# Patient Record
Sex: FEMALE | Race: WHITE | NOT HISPANIC OR LATINO | Employment: OTHER | URBAN - METROPOLITAN AREA
[De-identification: names, ages, dates, MRNs, and addresses within clinical notes are randomized per-mention and may not be internally consistent; named-entity substitution may affect disease eponyms.]

---

## 2018-12-18 LAB
ALT SERPL-CCNC: 28 IU/L (ref 12–68)
AST SERPL-CCNC: 11 IU/L (ref 12–37)
CREAT SERPL-MCNC: 1.09 MG/DL (ref 0.55–1.02)
GFR SERPL CREATININE-BSD FRML MDRD: 52 ML/MIN
GLUCOSE SERPL-MCNC: 99 MG/DL (ref 74–106)
POTASSIUM SERPL-SCNC: 4 MMOL/L (ref 3.5–5.1)
TSH SERPL-ACNC: 0.42 UIU/ML (ref 0.36–3.74)

## 2019-01-10 ENCOUNTER — TRANSFERRED RECORDS (OUTPATIENT)
Dept: HEALTH INFORMATION MANAGEMENT | Facility: CLINIC | Age: 54
End: 2019-01-10

## 2019-01-11 ENCOUNTER — MEDICAL CORRESPONDENCE (OUTPATIENT)
Dept: HEALTH INFORMATION MANAGEMENT | Facility: CLINIC | Age: 54
End: 2019-01-11

## 2019-03-05 ENCOUNTER — OFFICE VISIT (OUTPATIENT)
Dept: GASTROENTEROLOGY | Facility: CLINIC | Age: 54
End: 2019-03-05
Payer: MEDICARE

## 2019-03-05 VITALS
SYSTOLIC BLOOD PRESSURE: 123 MMHG | HEIGHT: 60 IN | WEIGHT: 214 LBS | HEART RATE: 77 BPM | OXYGEN SATURATION: 94 % | BODY MASS INDEX: 42.01 KG/M2 | DIASTOLIC BLOOD PRESSURE: 68 MMHG

## 2019-03-05 DIAGNOSIS — E66.01 MORBID OBESITY (H): ICD-10-CM

## 2019-03-05 DIAGNOSIS — R10.84 ABDOMINAL PAIN, GENERALIZED: Primary | ICD-10-CM

## 2019-03-05 DIAGNOSIS — R19.7 DIARRHEA, UNSPECIFIED TYPE: ICD-10-CM

## 2019-03-05 DIAGNOSIS — R10.13 DYSPEPSIA: ICD-10-CM

## 2019-03-05 DIAGNOSIS — K58.9 IRRITABLE BOWEL SYNDROME, UNSPECIFIED TYPE: ICD-10-CM

## 2019-03-05 LAB
ALBUMIN SERPL-MCNC: 3.7 G/DL (ref 3.4–5)
ALP SERPL-CCNC: 98 U/L (ref 40–150)
ALT SERPL W P-5'-P-CCNC: 26 U/L (ref 0–50)
AMYLASE SERPL-CCNC: 37 U/L (ref 30–110)
ANION GAP SERPL CALCULATED.3IONS-SCNC: 7 MMOL/L (ref 3–14)
AST SERPL W P-5'-P-CCNC: 11 U/L (ref 0–45)
BASOPHILS # BLD AUTO: 0.1 10E9/L (ref 0–0.2)
BASOPHILS NFR BLD AUTO: 0.8 %
BILIRUB SERPL-MCNC: 0.3 MG/DL (ref 0.2–1.3)
BUN SERPL-MCNC: 17 MG/DL (ref 7–30)
CALCIUM SERPL-MCNC: 9.3 MG/DL (ref 8.5–10.1)
CHLORIDE SERPL-SCNC: 110 MMOL/L (ref 94–109)
CO2 SERPL-SCNC: 24 MMOL/L (ref 20–32)
CREAT SERPL-MCNC: 0.94 MG/DL (ref 0.52–1.04)
CRP SERPL-MCNC: 3.5 MG/L (ref 0–8)
DIFFERENTIAL METHOD BLD: ABNORMAL
EOSINOPHIL # BLD AUTO: 0 10E9/L (ref 0–0.7)
EOSINOPHIL NFR BLD AUTO: 0.1 %
ERYTHROCYTE [DISTWIDTH] IN BLOOD BY AUTOMATED COUNT: 13.6 % (ref 10–15)
GFR SERPL CREATININE-BSD FRML MDRD: 68 ML/MIN/{1.73_M2}
GLUCOSE SERPL-MCNC: 123 MG/DL (ref 70–99)
HCT VFR BLD AUTO: 45.1 % (ref 35–47)
HGB BLD-MCNC: 14.5 G/DL (ref 11.7–15.7)
IMM GRANULOCYTES # BLD: 0.4 10E9/L (ref 0–0.4)
IMM GRANULOCYTES NFR BLD: 2.4 %
LIPASE SERPL-CCNC: 65 U/L (ref 73–393)
LYMPHOCYTES # BLD AUTO: 2.8 10E9/L (ref 0.8–5.3)
LYMPHOCYTES NFR BLD AUTO: 18.8 %
MCH RBC QN AUTO: 29.5 PG (ref 26.5–33)
MCHC RBC AUTO-ENTMCNC: 32.2 G/DL (ref 31.5–36.5)
MCV RBC AUTO: 92 FL (ref 78–100)
MONOCYTES # BLD AUTO: 0.9 10E9/L (ref 0–1.3)
MONOCYTES NFR BLD AUTO: 6.2 %
NEUTROPHILS # BLD AUTO: 10.8 10E9/L (ref 1.6–8.3)
NEUTROPHILS NFR BLD AUTO: 71.7 %
PLATELET # BLD AUTO: 315 10E9/L (ref 150–450)
POTASSIUM SERPL-SCNC: 4.4 MMOL/L (ref 3.4–5.3)
PROT SERPL-MCNC: 6.8 G/DL (ref 6.8–8.8)
RBC # BLD AUTO: 4.92 10E12/L (ref 3.8–5.2)
SODIUM SERPL-SCNC: 141 MMOL/L (ref 133–144)
WBC # BLD AUTO: 15.1 10E9/L (ref 4–11)

## 2019-03-05 PROCEDURE — 83690 ASSAY OF LIPASE: CPT | Performed by: INTERNAL MEDICINE

## 2019-03-05 PROCEDURE — 36415 COLL VENOUS BLD VENIPUNCTURE: CPT | Performed by: INTERNAL MEDICINE

## 2019-03-05 PROCEDURE — 99204 OFFICE O/P NEW MOD 45 MIN: CPT | Performed by: INTERNAL MEDICINE

## 2019-03-05 PROCEDURE — 86140 C-REACTIVE PROTEIN: CPT | Performed by: INTERNAL MEDICINE

## 2019-03-05 PROCEDURE — 80053 COMPREHEN METABOLIC PANEL: CPT | Performed by: INTERNAL MEDICINE

## 2019-03-05 PROCEDURE — 85025 COMPLETE CBC W/AUTO DIFF WBC: CPT | Performed by: INTERNAL MEDICINE

## 2019-03-05 PROCEDURE — 82150 ASSAY OF AMYLASE: CPT | Performed by: INTERNAL MEDICINE

## 2019-03-05 RX ORDER — TOPIRAMATE 100 MG/1
100 TABLET, FILM COATED ORAL 2 TIMES DAILY
Refills: 2 | COMMUNITY
Start: 2018-11-09

## 2019-03-05 RX ORDER — CHOLECALCIFEROL (VITAMIN D3) 50 MCG
1 TABLET ORAL DAILY
COMMUNITY

## 2019-03-05 RX ORDER — METOPROLOL SUCCINATE 25 MG/1
12.5 TABLET, EXTENDED RELEASE ORAL 2 TIMES DAILY
Refills: 3 | COMMUNITY
Start: 2019-02-07

## 2019-03-05 RX ORDER — LEVOTHYROXINE SODIUM 112 UG/1
112 TABLET ORAL DAILY
COMMUNITY

## 2019-03-05 RX ORDER — TRAMADOL HYDROCHLORIDE 50 MG/1
50 TABLET ORAL 2 TIMES DAILY
Refills: 2 | COMMUNITY
Start: 2019-02-23

## 2019-03-05 RX ORDER — RISPERIDONE 3 MG/1
1.5 TABLET ORAL
Status: ON HOLD | COMMUNITY
Start: 2017-06-24 | End: 2021-01-07

## 2019-03-05 RX ORDER — LORATADINE 10 MG/1
10 TABLET ORAL DAILY
COMMUNITY

## 2019-03-05 RX ORDER — NITROGLYCERIN 0.4 MG/1
0.4 TABLET SUBLINGUAL EVERY 5 MIN PRN
COMMUNITY
Start: 2019-02-15

## 2019-03-05 RX ORDER — LIOTHYRONINE SODIUM 5 UG/1
5 TABLET ORAL DAILY
COMMUNITY

## 2019-03-05 RX ORDER — LORAZEPAM 1 MG/1
1 TABLET ORAL
COMMUNITY

## 2019-03-05 RX ORDER — BUPROPION HYDROCHLORIDE 200 MG/1
1 TABLET, EXTENDED RELEASE ORAL DAILY
COMMUNITY
Start: 2019-01-08

## 2019-03-05 RX ORDER — HYDROXYCHLOROQUINE SULFATE 200 MG/1
TABLET, FILM COATED ORAL
Refills: 1 | COMMUNITY
Start: 2018-11-19

## 2019-03-05 RX ORDER — ATORVASTATIN CALCIUM 20 MG/1
20 TABLET, FILM COATED ORAL DAILY
COMMUNITY
Start: 2019-02-07

## 2019-03-05 RX ORDER — SERTRALINE HYDROCHLORIDE 100 MG/1
100 TABLET, FILM COATED ORAL DAILY
COMMUNITY

## 2019-03-05 ASSESSMENT — MIFFLIN-ST. JEOR: SCORE: 1487.2

## 2019-03-05 ASSESSMENT — PAIN SCALES - GENERAL: PAINLEVEL: EXTREME PAIN (8)

## 2019-03-05 NOTE — PROGRESS NOTES
GASTROENTEROLOGY NEW PATIENT CLINIC VISIT    CC/REFERRING MD:    Zhang Roberto    REASON FOR CONSULTATION:   Zhang Roberto for   Chief Complaint   Patient presents with     Consult     Abdominal pain and nausea after eating     HISTORY OF PRESENT ILLNESS:    Kaylah Arreola is 53 year old female who presents for evaluation of abdominal pain.  She reports that she has had abdominal pain for the last 6 months.  It occurs most days and happens within 20 minutes of eating and then last for a few hours at a time.  She has associated nausea.  She reports that she will sometimes get abdominal pain even when she is not eating.  She does not feel that passing gas or having bowel movements significantly improves the pain.  She typically has loose stools which she reports occurs up to 8 times per day.  She does not feel that her abdominal pain is worsened when she has more loose stools.  She notes that she has had evaluation with blood work, CT scan and UA for this.  She has taken acid reducing medication in the past with some relief of symptoms.  She notes that she does take Benefiber daily and feels that this helps.  She also notes that drinking tea or putting heat over her abdomen helps.  She has not lost any significant amount of weight.  She reports that she is on prednisone for bronchitis.  Prior history includes diagnostic laparoscopy in the past.  She had colonoscopy October 2018 with 3 polyps removed.  She reports that she has had EGD in the remote past.  Prior history includes AV block status post pacemaker.  She also has cardiogenic syncope.  She has multiple pain issues with fibromyalgia.  She also has depression, anxiety and bipolar disorder.    PROBLEM LIST  Patient Active Problem List    Diagnosis Date Noted     Morbid obesity (H) 03/05/2019     Priority: Medium       PERTINENT PAST MEDICAL HISTORY:  (I personally reviewed this history with the patient at today's visit)   Past Medical  History:   Diagnosis Date     Anxiety      AV block, 3rd degree (H)      Depression      Fibromyalgia      GERD (gastroesophageal reflux disease)      Syncope, cardiogenic          PREVIOUS SURGERIES: (I personally reviewed this history with the patient at today's visit)   Past Surgical History:   Procedure Laterality Date     EP PACEMAKER       HYSTERECTOMY           ALLERGIES:     Allergies   Allergen Reactions     Codeine Nausea     Other reaction(s): GI Upset, Vomiting  headaches       Hydrocodone-Acetaminophen Nausea and Nausea and Vomiting     Other reaction(s): Headache, Vomiting  headache       Metronidazole Unknown     Midodrine      Bloating.     Phenytoin Unknown     Other reaction(s): *Unknown     Ranitidine      sensitivity  Severe depression       Clarithromycin Rash     Other reaction(s): Vomiting     Metoclopramide Rash     depression    Other reaction(s): Emotional Disturbance  depression     Sulfa Drugs Hives and Rash       PERTINENT MEDICATIONS:    Current Outpatient Medications:      atorvastatin (LIPITOR) 20 MG tablet, Take 20 mg by mouth, Disp: , Rfl:      buPROPion (WELLBUTRIN SR) 200 MG 12 hr tablet, Take 1 tablet by mouth, Disp: , Rfl:      hydroxychloroquine (PLAQUENIL) 200 MG tablet, TAKE 1 TABLET BY MOUTH TWICE A DAY, Disp: , Rfl: 1     levothyroxine (SYNTHROID/LEVOTHROID) 112 MCG tablet, Take 224 mcg by mouth, Disp: , Rfl:      liothyronine (CYTOMEL) 5 MCG tablet, Take 5 mcg by mouth daily, Disp: , Rfl:      loratadine (CLARITIN) 10 MG tablet, Take 10 mg by mouth daily, Disp: , Rfl:      LORazepam (ATIVAN) 1 MG tablet, Take 1 mg by mouth Take 3 tabs at 8pm, Disp: , Rfl:      metoprolol succinate ER (TOPROL-XL) 25 MG 24 hr tablet, TAKE 0.5 TABLETS BY MOUTH TWICE A DAY, Disp: , Rfl: 3     nitroGLYcerin (NITROSTAT) 0.4 MG sublingual tablet, Place 0.4 mg under the tongue, Disp: , Rfl:      risperiDONE (RISPERDAL) 3 MG tablet, Take 1.5 mg by mouth, Disp: , Rfl:      sertraline (ZOLOFT) 100 MG  tablet, Take 200 mg by mouth, Disp: , Rfl:      sertraline (ZOLOFT) 50 MG tablet, TAKE ONE TAB BY MOUTH ONCE DAILY. TAKE ALONG WITH 200MG FOR A TOTAL DAILY DOSE OF 250MG, Disp: , Rfl: 3     tiZANidine (ZANAFLEX) 4 MG tablet, Take 4 mg by mouth, Disp: , Rfl:      topiramate (TOPAMAX) 50 MG tablet, TAKE 1 TAB BY MOUTH EVERY MORNING AND 2 TAB BY MOUTH NIGHTLY, Disp: , Rfl: 2     traMADol (ULTRAM) 50 MG tablet, TAKE 2 TABLETS BY MOUTH TWICE A DAY, Disp: , Rfl: 2     vitamin D3 (CHOLECALCIFEROL) 2000 units tablet, Take 1 tablet by mouth, Disp: , Rfl:     SOCIAL HISTORY:  Social History     Socioeconomic History     Marital status:      Spouse name: Not on file     Number of children: Not on file     Years of education: Not on file     Highest education level: Not on file   Occupational History     Not on file   Social Needs     Financial resource strain: Not on file     Food insecurity:     Worry: Not on file     Inability: Not on file     Transportation needs:     Medical: Not on file     Non-medical: Not on file   Tobacco Use     Smoking status: Former Smoker     Smokeless tobacco: Never Used   Substance and Sexual Activity     Alcohol use: Not on file     Drug use: Not on file     Sexual activity: Not on file   Lifestyle     Physical activity:     Days per week: Not on file     Minutes per session: Not on file     Stress: Not on file   Relationships     Social connections:     Talks on phone: Not on file     Gets together: Not on file     Attends Yazidism service: Not on file     Active member of club or organization: Not on file     Attends meetings of clubs or organizations: Not on file     Relationship status: Not on file     Intimate partner violence:     Fear of current or ex partner: Not on file     Emotionally abused: Not on file     Physically abused: Not on file     Forced sexual activity: Not on file   Other Topics Concern     Parent/sibling w/ CABG, MI or angioplasty before 65F 55M? Not Asked    Social History Narrative     Not on file       FAMILY HISTORY: (I personally reviewed this history with the patient at today's visit)  No family history of esophageal cancer, stomach cancer, colon cancer, pancreas cancer      ROS:    No fevers or chills  No weight loss  No blurry vision, double vision or change in vision  No sore throat  No lymphadenopathy  No headache, paraesthesias, or weakness in a limb  No shortness of breath or wheezing  No chest pain or pressure  No arthralgias or myalgias  No rashes or skin changes  No odynophagia or dysphagia  No BRBPR, hematochezia, melena  No dysuria, frequency or urgency  No hot/cold intolerance or polyria  No anxiety or depression  PHYSICAL EXAMINATION:  Constitutional: aaox3, cooperative, pleasant, not dyspneic/diaphoretic, no acute distress  Vitals reviewed: /68   Pulse 77   Ht 1.524 m (5')   Wt 97.1 kg (214 lb)   SpO2 94%   BMI 41.79 kg/m    Wt:   Wt Readings from Last 2 Encounters:   03/05/19 97.1 kg (214 lb)      Eyes: Sclera anicteric/injected  Ears/nose/mouth/throat: Normal oropharynx without ulcers or exudate, mucus membranes moist, hearing intact  Neck: supple, thyroid normal size  CV: No edema, RRR  Respiratory: Unlabored breathing, CTAB  Lymph: No submandibular, supraclavicular or inguinal lymphadenopathy  Abd: obese, Nondistended, no masses, +bs, no hepatosplenomegaly, mildly tender LLQ, no peritoneal signs  Skin: warm, perfused, no jaundice  Psych: Normal affect  MSK: Normal gait      PERTINENT STUDIES: (I personally reviewed these laboratory studies today)  Most recent CBC:   Recent Labs   Lab Test 03/05/19  1514   WBC 15.1*   HGB 14.5   HCT 45.1        Most recent hepatic panel:  Recent Labs   Lab Test 03/05/19  1514 12/18/18   ALT 26 28   AST 11 11*     Most recent creatinine:  Recent Labs   Lab Test 03/05/19  1514 12/18/18   CR 0.94 1.09*       RADIOLOGY:      IMPRESSION:     Essentially negative exam. No finding as cause for  abdominal pain.    REPORT SIGNED BY DR. Mark Rivas   Result Narrative   EXAM: CT ABDOMEN AND PELVIS WITH CONTRAST, 1/17/2019    CLINICAL DATA: Unspecified abdominal pain. Abdominal pain, acute.    COMPARISON: 12/22/2008.    TECHNIQUE: Axial, sagittal, and coronal images of the abdomen and pelvis reconstructed after IV and oral contrast administration.    CONTRAST: 100  ml Omnipaque 350 IV    FINDINGS: Lung bases are clear. Pacemaker leads visible.    Liver, gallbladder, spleen, pancreas, and adrenal glands are normal.    Normal left kidney. 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 Appearance unchanged from before.    Stomach and small bowel are normal. Appendix not visible, but there is no pericecal inflammation. Normal colon.    Urinary bladder has normal contours. Hysterectomy. No adnexal abnormality.    No free fluid, lymphadenopathy, or inflammatory fat stranding. Normal abdominal aorta.    Fairly advanced disc degeneration at L1-2 with interspace collapse and diffuse disc bulge with osteophytes. No acute bone abnormality.   Other Result Information   Interface, [facility] - 01/17/2019  5:09 PM CST  EXAM: CT ABDOMEN AND PELVIS WITH CONTRAST, 1/17/2019    CLINICAL DATA: Unspecified abdominal pain. Abdominal pain, acute.    COMPARISON: 12/22/2008.    TECHNIQUE: Axial, sagittal, and coronal images of the abdomen and pelvis reconstructed after IV and oral contrast administration.    CONTRAST: 100  ml Omnipaque 350 IV    FINDINGS: Lung bases are clear. Pacemaker leads visible.    Liver, gallbladder, spleen, pancreas, and adrenal glands are normal.    Normal left kidney. 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 Appearance unchanged from before.    Stomach and small bowel are normal. Appendix not visible, but there is no pericecal inflammation. Normal colon.    Urinary bladder has normal contours. Hysterectomy. No adnexal  abnormality.    No free fluid, lymphadenopathy, or inflammatory fat stranding. Normal abdominal aorta.    Fairly advanced disc degeneration at L1-2 with interspace collapse and diffuse disc bulge with osteophytes. No acute bone abnormality.    IMPRESSION  IMPRESSION:     Essentially negative exam. No finding as cause for abdominal pain.    REPORT SIGNED BY DR. Mark Rivas       (I personally reviewed the radiology images listed above)    ASSESSMENT/PLAN:    Kaylah Arreola is a 53 year old female who presents for evaluation of postprandial abdominal pain.  Suspect that she may have some combination of dyspepsia and perhaps irritable bowel.  I suggested that she have further evaluation with lab tests in order to ensure that this is not celiac disease, rule out pancreatic or liver pathology, evaluate for inflammation.  We will also get stool test to rule out any infectious issue or inflammation in the bowel.  I recommended an ultrasound to assess the gallbladder for the presence of gallstones in the gallbladder or the bile duct.  She should also be seen by nutrition for dietary counseling as I suspect this may play a large role underlying her issues.  We will then plan to see her back in the office.  We discussed that a trial of Bentyl could be considered but will hold off at this time given that she is also already on multiple medications and prefers to not add to her medication regimen at this point.      Abdominal pain, generalized  Dyspepsia  Irritable bowel syndrome, unspecified type  Diarrhea, unspecified type  Morbid obesity (H)  Orders Placed This Encounter   Procedures     CRP inflammation     Standing Status:   Future     Number of Occurrences:   1     Standing Expiration Date:   3/4/2020     Calprotectin Feces     Standing Status:   Future     Standing Expiration Date:   3/4/2020     Comprehensive metabolic panel     Standing Status:   Future     Number of Occurrences:   1     Standing Expiration Date:    3/4/2020     CBC with platelets differential     Last Lab Result: No results found for: HGB     Standing Status:   Future     Number of Occurrences:   1     Standing Expiration Date:   3/4/2020     Lipase     Standing Status:   Future     Number of Occurrences:   1     Standing Expiration Date:   3/4/2020     Amylase     Standing Status:   Future     Number of Occurrences:   1     Standing Expiration Date:   3/4/2020     H Pylori antigen stool     Standing Status:   Future     Standing Expiration Date:   3/4/2020     Clostridium difficile toxin B PCR     Standing Status:   Future     Standing Expiration Date:   4/4/2019     Ova and Parasite Exam Routine     Standing Status:   Future     Standing Expiration Date:   3/4/2020         RTC 3 months    Thank you for this consultation.  It was a pleasure to participate in the care of this patient; please contact us with any further questions.   This note was created with voice recognition software, and while reviewed for accuracy, typos may remain.     Zhen Owens MD  Adjunct  of Medicine  Division of Gastroenterology, Hepatology and Nutrition  [facility]  [phone]

## 2019-03-05 NOTE — LETTER
March 13, 2019      Kaylah OH Maxwell  [address]  [address]  [place] [address]        Dear ,    We are writing to inform you of your test results.  The labs are all within the expected range.  The white blood cell count is slightly high which may be from recent prednisone use.  Please let us know if you have any questions.      Resulted Orders   Amylase   Result Value Ref Range    Amylase 37 30 - 110 U/L   Lipase   Result Value Ref Range    Lipase 65 (L) 73 - 393 U/L   CBC with platelets differential   Result Value Ref Range    WBC 15.1 (H) 4.0 - 11.0 10e9/L    RBC Count 4.92 3.8 - 5.2 10e12/L    Hemoglobin 14.5 11.7 - 15.7 g/dL    Hematocrit 45.1 35.0 - 47.0 %    MCV 92 78 - 100 fl    MCH 29.5 26.5 - 33.0 pg    MCHC 32.2 31.5 - 36.5 g/dL    RDW 13.6 10.0 - 15.0 %    Platelet Count 315 150 - 450 10e9/L    Diff Method Automated Method     % Neutrophils 71.7 %    % Lymphocytes 18.8 %    % Monocytes 6.2 %    % Eosinophils 0.1 %    % Basophils 0.8 %    % Immature Granulocytes 2.4 %    Absolute Neutrophil 10.8 (H) 1.6 - 8.3 10e9/L    Absolute Lymphocytes 2.8 0.8 - 5.3 10e9/L    Absolute Monocytes 0.9 0.0 - 1.3 10e9/L    Absolute Eosinophils 0.0 0.0 - 0.7 10e9/L    Absolute Basophils 0.1 0.0 - 0.2 10e9/L    Abs Immature Granulocytes 0.4 0 - 0.4 10e9/L   Comprehensive metabolic panel   Result Value Ref Range    Sodium 141 133 - 144 mmol/L    Potassium 4.4 3.4 - 5.3 mmol/L    Chloride 110 (H) 94 - 109 mmol/L    Carbon Dioxide 24 20 - 32 mmol/L    Anion Gap 7 3 - 14 mmol/L    Glucose 123 (H) 70 - 99 mg/dL      Comment:      Non Fasting    Urea Nitrogen 17 7 - 30 mg/dL    Creatinine 0.94 0.52 - 1.04 mg/dL    GFR Estimate 68 >60 mL/min/[1.73_m2]      Comment:      Non  GFR Calc  Starting 12/18/2018, serum creatinine based estimated GFR (eGFR) will be   calculated using the Chronic Kidney Disease Epidemiology Collaboration   (CKD-EPI) equation.      GFR Estimate If Black 79 >60 mL/min/[1.73_m2]       Comment:       GFR Calc  Starting 12/18/2018, serum creatinine based estimated GFR (eGFR) will be   calculated using the Chronic Kidney Disease Epidemiology Collaboration   (CKD-EPI) equation.      Calcium 9.3 8.5 - 10.1 mg/dL    Bilirubin Total 0.3 0.2 - 1.3 mg/dL    Albumin 3.7 3.4 - 5.0 g/dL    Protein Total 6.8 6.8 - 8.8 g/dL    Alkaline Phosphatase 98 40 - 150 U/L    ALT 26 0 - 50 U/L    AST 11 0 - 45 U/L   CRP inflammation   Result Value Ref Range    CRP Inflammation 3.5 0.0 - 8.0 mg/L       If you have any questions or concerns, please call the clinic at the number listed above.       Sincerely,        Zhen Owens MD

## 2019-03-05 NOTE — NURSING NOTE
Kaylah Arreola's goals for this visit include:   Chief Complaint   Patient presents with     Consult     Abdominal pain and nausea after eating       She requests these members of her care team be copied on today's visit information: yes    PCP: Zhang Roberto    Referring Provider:  Zhang Roberto  [facility]  [address] [place] [address]    /68   Pulse 77   Ht 1.524 m (5')   Wt 97.1 kg (214 lb)   SpO2 94%   BMI 41.79 kg/m      Do you need any medication refills at today's visit? No    Gaudencio Anguiano, CMA

## 2019-03-08 DIAGNOSIS — R10.84 ABDOMINAL PAIN, GENERALIZED: ICD-10-CM

## 2019-03-08 DIAGNOSIS — R19.7 DIARRHEA, UNSPECIFIED TYPE: ICD-10-CM

## 2019-03-08 DIAGNOSIS — R10.13 DYSPEPSIA: ICD-10-CM

## 2019-03-08 LAB
C DIFF TOX B STL QL: NEGATIVE
SPECIMEN SOURCE: NORMAL

## 2019-03-08 PROCEDURE — 83993 ASSAY FOR CALPROTECTIN FECAL: CPT | Performed by: INTERNAL MEDICINE

## 2019-03-08 PROCEDURE — 87177 OVA AND PARASITES SMEARS: CPT | Performed by: INTERNAL MEDICINE

## 2019-03-08 PROCEDURE — 87338 HPYLORI STOOL AG IA: CPT | Performed by: INTERNAL MEDICINE

## 2019-03-08 PROCEDURE — 87493 C DIFF AMPLIFIED PROBE: CPT | Performed by: INTERNAL MEDICINE

## 2019-03-08 PROCEDURE — 87209 SMEAR COMPLEX STAIN: CPT | Performed by: INTERNAL MEDICINE

## 2019-03-11 LAB
H PYLORI AG STL QL IA: NORMAL
O+P STL MICRO: NORMAL
O+P STL MICRO: NORMAL
SPECIMEN SOURCE: NORMAL
SPECIMEN SOURCE: NORMAL

## 2019-03-12 ENCOUNTER — ANCILLARY PROCEDURE (OUTPATIENT)
Dept: ULTRASOUND IMAGING | Facility: CLINIC | Age: 54
End: 2019-03-12
Attending: INTERNAL MEDICINE
Payer: MEDICARE

## 2019-03-12 DIAGNOSIS — R10.84 ABDOMINAL PAIN, GENERALIZED: ICD-10-CM

## 2019-03-12 LAB — CALPROTECTIN STL-MCNT: <27 MG/KG (ref 0–49.9)

## 2019-03-12 PROCEDURE — 76700 US EXAM ABDOM COMPLETE: CPT | Performed by: RADIOLOGY

## 2019-03-14 DIAGNOSIS — R10.13 ABDOMINAL PAIN, EPIGASTRIC: Primary | ICD-10-CM

## 2019-03-14 DIAGNOSIS — R10.13 DYSPEPSIA: ICD-10-CM

## 2019-03-14 NOTE — TELEPHONE ENCOUNTER
[facility] Call Center    Phone Message    May a detailed message be left on voicemail: no    Reason for Call: Requesting Results   Name/type of test: stool and blood test resutls  Date of test: 3.8.19  Was test done at a location other than [facility] (Please fill in the location if not [facility])?: No      Action Taken: Message routed to:  Adult Clinics: Gastroenterology (GI) p [id number]

## 2019-03-14 NOTE — TELEPHONE ENCOUNTER
YOBANI spoke with patient and reviewed lab results and letter with her. Patient would like to know what the next steps should be moving forward.     Encounter routed to MD for further recommendations.     YOBANI Matthews

## 2019-03-18 NOTE — TELEPHONE ENCOUNTER
Zhen Owens MD de La Rosa, Mersadiez, CMA   Caller: Unspecified (4 days ago,  1:02 PM)             Please let her know the followin.  She should schedule appointment with Samantha Us of nutrition     2.  Start omeprazole 20mg daily.  Take 30 minutes prior to breakfast.  It can be obtained OTC or I can send Rx.     3.  Then follow up in the office as scheduled.        YOBANI spoke with patient. Assisted in making appt with Samantha Us. She also has an appt with ADE Rodríguez in May and will keep this appt.  Patient would also like the Omeprazole 20mg sent as a prescription.     Message forwarded to [facility] for prescription fill.    YOBANI Matthews

## 2019-03-18 NOTE — TELEPHONE ENCOUNTER
Patient following up on this medication request from this morning.  She states that she has been waiting for Dr. Owens to send in the Omeprazole and it hasn't been sent yet.  Please call patient when it has been sent.

## 2019-03-29 ENCOUNTER — TELEPHONE (OUTPATIENT)
Dept: GASTROENTEROLOGY | Facility: CLINIC | Age: 54
End: 2019-03-29

## 2019-03-29 NOTE — TELEPHONE ENCOUNTER
I reached out to [employer] which is patient's secondary insurance. [employer] was requesting CPT codes for patient's nutrition referral. Codes given were 66933 and 35736. No PA is required and both codes are covered under this insurance.     Also reached out to patient to discuss. Advised patient that both CPT codes were given to [employer]. Patient understands that Medicare does not cover the visits but that [employer] will  whatever is not covered.     Per Gabriella with [employer]. Patient's [employer] plan follows Medicaid guidelines and will cover what Medicare does not. Ref # [id number].

## 2019-03-29 NOTE — TELEPHONE ENCOUNTER
[facility] Call Center    Phone Message    May a detailed message be left on voicemail: no    Reason for Call: Other: Pt has question regarding insurance coverage for procedure with Dr Owens.  Pt states insurance company is asking for a call back from dept.  [employer].  Phone: [phone]    Or call provider number [phone]    Nutritional evaluation.  [employer] also needs a procedure and diagnosis code.        Action Taken: Message routed to:  Adult Clinics: Gastroenterology (GI) p [id number]

## 2019-03-29 NOTE — TELEPHONE ENCOUNTER
[facility] Call Center    Phone Message  Patient calling to give information regarding insurance - coding -wording for last visit, Need to call [employer] for a code.   Please call patient, she would like to explain it in further detail.     May a detailed message be left on voicemail: yes    Reason for Call: Other: Patient calling to give information regarding insurance - coding -wording for last visit, Need to call [employer] for a code.      Action Taken: Message routed to:  Adult Clinics: Gastroenterology (GI) p [id number]

## 2019-03-29 NOTE — TELEPHONE ENCOUNTER
Nurse returned call to patient and gave her the number to call the financial counselors.     Krissy Wu RN, BSN, PHN  [facility]  GI/Gen Surg/Hepatology Care Coordinator

## 2019-04-23 ENCOUNTER — OFFICE VISIT (OUTPATIENT)
Dept: NUTRITION | Facility: CLINIC | Age: 54
End: 2019-04-23
Payer: MEDICARE

## 2019-04-23 DIAGNOSIS — R19.7 DIARRHEA, UNSPECIFIED TYPE: ICD-10-CM

## 2019-04-23 DIAGNOSIS — K58.9 IBS (IRRITABLE BOWEL SYNDROME): Primary | ICD-10-CM

## 2019-04-23 DIAGNOSIS — E66.01 MORBID OBESITY (H): ICD-10-CM

## 2019-04-23 PROCEDURE — 97802 MEDICAL NUTRITION INDIV IN: CPT | Performed by: DIETITIAN, REGISTERED

## 2019-04-23 NOTE — PROGRESS NOTES
Medical Nutrition Therapy  Visit Type:Initial assessment and intervention    Referring Provider: No ref. provider found  Internal ([facility]) gastroenterology    REASON FOR REFERRAL:   Kaylha Arreola presents today for MNT and education related to weight management and IBS   She is accompanied by spouse Maco.     NUTRITION ASSESSMENT:   Patient comments/concerns relating to nutrition: Kaylah Arreola is 53 year old female who is struggling with digestive issues (abdominal pain, nausea, diarrhea). Her abdominal pain has incrased over the past 6 months and can is consistent both when eating and not eating. She has been having more diarrhea lately. It used to be 8-10x per day and over the past couple weeks have gotten better as she is not eating as much.  She hasn't been able to pinpoint what foods cause her issues. She has been taking benefiber daily and notices that helps. Also, when she has kambucha she notices that the probiotics do help decrease the diarrhea. She has tried the probiotic over the counter in a pill form and just doesn't take consistently to notice a differeance. She is on medical assistance and has free home delivery and notices there is nothing good in the meals they bring. She often can't eat because she will get horrible digestive issues. She hasn't tried the low carb or gluten free options yet. She wants to find out what foods are best for her to eat, as it seems everything goes right through her.       Food/Nutrition History:    Dietary Changes:She and her  have been working on trying to take out pop.     Food allergies/sensitivities/intolerances: Lactose intolerance                                                                               Eating Patterns & Meal Planning:    Waking up at: 5-6am   Going to bed at:    Consuming Breakfast: Yes 2 times/week  8am has coffee black - used to have breakfast more daily because it gives stomach cramps - she would have muffin. She seems to tolerate  peanut butter with potato bread which seems to be ok. They stopped doing whole grain wheat because of the diverticulitis       Consuming Lunch:  Yes 3 times/week  11:30am leftover chicken, bagon casserole       Consuming Dinner:  Yes 7 times/week  6pm Chicken, potatoes, coleslaw       Snacks: Yes 4 times/week  3pm chips       Skips meals/snacks: Yes  Mostly breakfast and lunch     Beverages: Yes coffee 3-4 cups, milk, soda, water 4-5 8oz cups per day    Barriers around meals/snacks include:Motivation/Ready to change , Financial concerns, Lack of cooking skills , Lack of control over emotions/behaviors, Lack of nutrition knowlege and digestive issues       Diet is high in: carbs from refined grains/sugar, pop, coffee, and dairy (higher fodmap foods/nightshades - potatoes)   Diet is low in: fat (unsaturated), fiber, fruits, protein and vegetables    Grocery Shopping: No every 2 weeks     Barriers Include:Motivation/Ready to change , Lack of cooking skills , Lack of control over emotions/behaviors and Lack of nutrition knowlege    Reading Food Labels:  No  Barriers Include:Motivation/Ready to change , Lack of cooking skills , Lack of control over emotions/behaviors and Lack of nutrition knowlege    Cooking at home: No  does cooking     Eats out: Yes   1-3 meals/per week mostly bar/grill type places  Barriers around eating/cooking Include:Motivation/Ready to change , Financial concerns, Lack of cooking skills , Lack of control over emotions/behaviors and Lack of nutrition knowlege      Lifestyle Patterns:  Feels stressed with life right now: No      no regular exercise program    Struggling with sleep: Yes    Average hours of daily sleep: 7-9 hours daily   Barriers Include:wakes up in middle of night, snacks during night, takes meds for sleep, feels tired upon waking or during day     Social History:   Lives with: spouse      Pertinent Past Medical History:   I have reviewed this patient's medical history  and updated it with pertinent information if needed.   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  Past Surgical History:   Procedure Laterality Date     EP PACEMAKER       HYSTERECTOMY         Physical Findings:   Digestive System:stomach or abdominal pains, cramping , heartburn/acid reflux or indigestion  and nausea       Normal Bowl Movements: No  # of Bowl movements: >3x per day   Irregular Bowl Movements: varies a lot , loose , liquid  and formed and soft     Medications:  Current Outpatient Medications   Medication     atorvastatin (LIPITOR) 20 MG tablet     buPROPion (WELLBUTRIN SR) 200 MG 12 hr tablet     hydroxychloroquine (PLAQUENIL) 200 MG tablet     levothyroxine (SYNTHROID/LEVOTHROID) 112 MCG tablet     liothyronine (CYTOMEL) 5 MCG tablet     loratadine (CLARITIN) 10 MG tablet     LORazepam (ATIVAN) 1 MG tablet     metoprolol succinate ER (TOPROL-XL) 25 MG 24 hr tablet     nitroGLYcerin (NITROSTAT) 0.4 MG sublingual tablet     omeprazole (PRILOSEC) 20 MG DR capsule     risperiDONE (RISPERDAL) 3 MG tablet     sertraline (ZOLOFT) 100 MG tablet     sertraline (ZOLOFT) 50 MG tablet     tiZANidine (ZANAFLEX) 4 MG tablet     topiramate (TOPAMAX) 50 MG tablet     traMADol (ULTRAM) 50 MG tablet     vitamin D3 (CHOLECALCIFEROL) 2000 units tablet     No current facility-administered medications for this visit.        LABS:  Last Basic Metabolic Panel:  Lab Results   Component Value Date     03/05/2019      Lab Results   Component Value Date    POTASSIUM 4.4 03/05/2019     Lab Results   Component Value Date    CHLORIDE 110 03/05/2019     Lab Results   Component Value Date    MARIOLA 9.3 03/05/2019     Lab Results   Component Value Date    CO2  24 03/05/2019     Lab Results   Component Value Date    BUN 17 03/05/2019     Lab Results   Component Value Date    CR 0.94 03/05/2019     Lab Results   Component Value Date     03/05/2019       Last Glucose Profile:   No results found for: A1C    Last Lipid Profile:   No results found for: CHOL  No results found for: HDL  No results found for: LDL  No results found for: TRIG  No results found for: CHOLHDLRATIO    Most recent CBC:  Recent Labs   Lab Test 03/05/19  1514   WBC 15.1*   HGB 14.5   HCT 45.1        Most recent hepatic panel:  Recent Labs   Lab Test 03/05/19  1514 12/18/18   ALT 26 28   AST 11 11*     Most recent creatinine:  Recent Labs   Lab Test 03/05/19  1514 12/18/18   CR 0.94 1.09*       Last Thyroid Profile:   TSH   Date Value Ref Range Status   12/18/2018 0.416 0.358 - 3.740 UIU/mL Final       Last Mineral Profile:   No results found for: MATTHIEU, IRON, FEB      Last Vitamin Profile:   No results found for: BNS533, NXQK704, [id number], VITD3, D2VIT, D3VIT, DTOT, [id number], [id number], [id number], [id number], [id number], [id number]    ANTHROPOMETRICS:  Vitals:   BP Readings from Last 1 Encounters:   03/05/19 123/68     Pulse Readings from Last 1 Encounters:   03/05/19 77     Estimated body mass index is 41.79 kg/m  as calculated from the following:    Height as of 3/5/19: 1.524 m (5').    Weight as of 3/5/19: 97.1 kg (214 lb).    Wt Readings from Last 5 Encounters:   03/05/19 97.1 kg (214 lb)         NUTRITION DIAGNOSIS:   1. Altered GI function related to high intake of fodmap foods from dairy/pop, and processed grains/wheat as evidenced by food recall, BMI of 41.     2. Overweight/Obesity related to high intake of processed sugars/pop- low protein/healhty fats as evidenced by BMI of 41.     NUTRITION INTERVENTION:  1. Eliminate inflammatory and high fodmap foods. Start elimination of most inflammatory foods to help identify if they are triggering digestive issues, weight imbalances.  "Cut out gluten, dairy for 30 to see how your gut feels and what happens to your other symptoms.  Keep a food record of your usual intake and symptoms for 30 days use [employer] regis. Also will take into consideration nightshades as well as high fodmaps as we go.   2. Work on snack and meal planning. Grabbing more hemp powder/kaitlynn powder, nut butters/avocado with lowfod fruit/guac with veggies/apple/banana with almond butter, hummus. Start with incorporating smoothie for breakfast/snacks that includes a protein, heathy fat, fruit, veggies and low sugar liquid such as unsweetened almond milk. Also, protein bone broth with tea. Even add 1 tsp of coconut oil for heathy fat.   4. Increase intake of healthy fats- Omega 3's from wild caught fish, kaitlynn/flax powder, avocados, avocado oil, primal kitchen avocado nelson/dressing, 100% grass fed butter/ghee.   Note: leaning importance of incorporating healthy fats and understanding how low carb, low fat, calorie counting and how research has changed over the years. Read book \"Eat Fat Get Thin\" and get the cookbook to go with to help increase knowledge around importance of healthy fats.   5. Review the labels and watching out for high carb/high sugar foods that may be low in calories but not high in nutrients like handful of mixed nuts.- focus on 6-15g or carbs is more low and 0-<5g of sugar is low and 5g of fiber or more is high.   6. Start [employer] womens essentials prime multivitamin with omega 3 fats and bone building support. 1 packet daily: take half packet  twice daily with meal and or snack.   7. Rebuild your friendly bacteria in your microbime. Start taking probiotic supplements. They will help rebuild the healthy bacteria so essential to good gut health. Recommend trying BIOHM health to help break down digestive plaque and get in amylase and variety of good bacteria.  Take daily anytime with our without food.      -Recipes & Food Lists:  Breakfast smoothie   Eat fat get " thin 21 day meal plan with recipes     PATIENT'S BEHAVIOR CHANGE GOALS:   See nutrition prescription for patient stated behavior change goals. AVS was printed and given to patient at today's appointment.    MONITOR / EVALUATE:  Registered Dietitian will monitor/evaluate the following:     Beliefs and attitudes related to food    Food and nutrition knowledge / skills    Food / Beverage / Nutrient intake     Pertinent Labs    Progress toward meeting stated nutrition-related goals    Readiness to change nutrition-related behaviors    Weight change    Digestion     COORDINATION OF CARE:  Follow up with gastroenterology      FOLLOW-UP:  Follow-up appointment scheduled in 2 weeks.         Time spent in minutes: 90 mins or 6 units   Encounter: Individual    Samantha Us RD, CLT, LD  Integrative Registered Dietitian

## 2019-04-23 NOTE — PATIENT INSTRUCTIONS
"NUTRITION INTERVENTION:  1. Eliminate inflammatory and high fodmap foods. Start elimination of most inflammatory foods to help identify if they are triggering digestive issues, weight imbalances. Cut out gluten, dairy for 30 to see how your gut feels and what happens to your other symptoms.  Keep a food record of your usual intake and symptoms for 30 days use [employer] regis. Also will take into consideration nightshades as well as high fodmaps as we go.   2. Work on snack and meal planning. Grabbing more hemp powder/kaitlynn powder, nut butters/avocado with lowfod fruit/guac with veggies/apple/banana with almond butter, hummus. Start with incorporating smoothie for breakfast/snacks that includes a protein, heathy fat, fruit, veggies and low sugar liquid such as unsweetened almond milk. Also, protein bone broth with tea. Even add 1 tsp of coconut oil for heathy fat.   4. Increase intake of healthy fats- Omega 3's from wild caught fish, kaitlynn/flax powder, avocados, avocado oil, primal kitchen avocado nelson/dressing, 100% grass fed butter/ghee.   Note: leaning importance of incorporating healthy fats and understanding how low carb, low fat, calorie counting and how research has changed over the years. Read book \"Eat Fat Get Thin\" and get the cookbook to go with to help increase knowledge around importance of healthy fats.   5. Review the labels and watching out for high carb/high sugar foods that may be low in calories but not high in nutrients like handful of mixed nuts.- focus on 6-15g or carbs is more low and 0-<5g of sugar is low and 5g of fiber or more is high.   6. Start [employer] womens essentials prime multivitamin with omega 3 fats and bone building support. 1 packet daily: take half packet  twice daily with meal and or snack.   7. Rebuild your friendly bacteria in your microbime. Start taking probiotic supplements. They will help rebuild the healthy bacteria so essential to good gut health. Recommend trying BIOHM " health to help break down digestive plaque and get in amylase and variety of good bacteria.  Take daily anytime with our without food.      -Recipes & Food Lists:  Breakfast smoothie   Eat fat get thin 21 day meal plan with recipes

## 2019-05-22 ENCOUNTER — OFFICE VISIT (OUTPATIENT)
Dept: GASTROENTEROLOGY | Facility: CLINIC | Age: 54
End: 2019-05-22
Payer: MEDICARE

## 2019-05-22 VITALS
SYSTOLIC BLOOD PRESSURE: 126 MMHG | DIASTOLIC BLOOD PRESSURE: 77 MMHG | HEART RATE: 94 BPM | BODY MASS INDEX: 42.07 KG/M2 | HEIGHT: 60 IN | WEIGHT: 214.3 LBS | OXYGEN SATURATION: 96 %

## 2019-05-22 DIAGNOSIS — R10.84 GENERALIZED POSTPRANDIAL ABDOMINAL PAIN: Primary | ICD-10-CM

## 2019-05-22 DIAGNOSIS — R19.7 DIARRHEA, UNSPECIFIED TYPE: ICD-10-CM

## 2019-05-22 PROCEDURE — 99213 OFFICE O/P EST LOW 20 MIN: CPT | Performed by: PHYSICIAN ASSISTANT

## 2019-05-22 ASSESSMENT — MIFFLIN-ST. JEOR: SCORE: 1488.56

## 2019-05-22 ASSESSMENT — PAIN SCALES - GENERAL: PAINLEVEL: SEVERE PAIN (6)

## 2019-05-22 NOTE — NURSING NOTE
Kaylah Arreola's goals for this visit include:   Chief Complaint   Patient presents with     RECHECK     F/u for abdominal pain; Patient reports symptoms have improved.       She requests these members of her care team be copied on today's visit information: yes    PCP: Zhang Roberto MD    Referring Provider:  Zhang Roberto  [id number] [facility] [address] [place] [address]    /77 (BP Location: Left arm, Patient Position: Sitting, Cuff Size: Adult Regular)   Pulse 94   Ht 1.524 m (5')   Wt 97.2 kg (214 lb 4.8 oz)   SpO2 96%   BMI 41.85 kg/m      Do you need any medication refills at today's visit? No    Susan Vergara LPN

## 2019-05-22 NOTE — PROGRESS NOTES
GASTROENTEROLOGY FOLLOW UP CLINIC VISIT    CC/REFERRING MD:    Zhang Roberto    REASON FOR CONSULTATION:   Referred by Zhang Roberto for RECHECK (F/u for abdominal pain; Patient reports symptoms have improved.)      HISTORY OF PRESENT ILLNESS:    Kaylah Arreola is 53 year old female who presents for follow up. She was initially seen on 3/5/19 for 6 months of abdominal pain.  Pain was occurring about 20 minutes after eating and could last for several hours at a time.  Associated symptoms included nausea.  She was also noting about 8 loose bowel movements a day.  She did not feel that passing gas or having a bowel movement would improve her pain.  Her prior work-up included blood work, CT scan and urinalysis.  She did have a previous colonoscopy in July 2018 which revealed 3 polyps, she is due for repeat colonoscopy in 2021. Dr. Owens further evaluated with stool studies which were negative including fecal calprotectin, H. pylori, C. difficile and ova and parasites.  Enzymes and liver enzymes were also unremarkable.  She was also evaluated with an abdominal sonogram which was unremarkable.  She was suspected to have dyspepsia and/or irritable bowel syndrome.  She was referred to nutritionist to determine possible food triggers and has been advised to cut out gluten, dairy and sugar.  She states she has noticed a significant improvement in her symptoms since making these dietary changes.  She no longer has abdominal pain.  Her bowel movements have decreased in frequency from 8 stools a day to 4 times a day.  Her stools are completely formed and she denies having diarrhea.  She is pleased with her changes and plans to continue with nutrition        PREVIOUS ENDOSCOPY:  Colonoscopy July 2018 ([facility])   Impression:  - One 10 mm polyp in the cecum, removed with a hot snare. Resected and retrieved. Clip was placed.                       - Two 4 to 7 mm polyps in the cecum,  removed with a hot snare. Resected and retrieved. Clip was placed.                       - The examined portion of the ileum was normal.                       - The examination was otherwise normal.  Recommendation:      - Repeat colonoscopy in 3 years for surveillance.    Pathology   Final Diagnosis   Colon, cecum polyp ×3, biopsy - Tubular adenomas     Clinical Information Screen, family history colon polyps     Gross Description Cecum polyp ×3: Four up to 0.6 cm. IT-1.     Microscopic Description Tubular adenomas have no high-grade dysplasia.              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     Phenytoin Unknown     Other reaction(s): *Unknown     Ranitidine      sensitivity  Severe depression       Clarithromycin Rash     Other reaction(s): Vomiting     Metoclopramide Rash     depression    Other reaction(s): Emotional Disturbance  depression     Sulfa Drugs Hives and Rash       PERTINENT MEDICATIONS:    Current Outpatient Medications:      atorvastatin (LIPITOR) 20 MG tablet, Take 20 mg by mouth, Disp: , Rfl:      buPROPion (WELLBUTRIN SR) 200 MG 12 hr tablet, Take 1 tablet by mouth, Disp: , Rfl:      hydroxychloroquine (PLAQUENIL) 200 MG tablet, TAKE 1 TABLET BY MOUTH TWICE A DAY, Disp: , Rfl: 1     levothyroxine (SYNTHROID/LEVOTHROID) 112 MCG tablet, Take 224 mcg by mouth, Disp: , Rfl:      liothyronine (CYTOMEL) 5 MCG  tablet, Take 5 mcg by mouth daily, Disp: , Rfl:      loratadine (CLARITIN) 10 MG tablet, Take 10 mg by mouth daily, Disp: , Rfl:      LORazepam (ATIVAN) 1 MG tablet, Take 1 mg by mouth Take 3 tabs at 8pm, Disp: , Rfl:      metoprolol succinate ER (TOPROL-XL) 25 MG 24 hr tablet, TAKE 0.5 TABLETS BY MOUTH TWICE A DAY, Disp: , Rfl: 3     omeprazole (PRILOSEC) 20 MG DR capsule, Take 1 capsule (20 mg) by mouth daily, Disp: 30 capsule, Rfl: 5     risperiDONE (RISPERDAL) 3 MG tablet, Take 1.5 mg by mouth, Disp: , Rfl:      sertraline (ZOLOFT) 100 MG tablet, Take 200 mg by mouth, Disp: , Rfl:      sertraline (ZOLOFT) 50 MG tablet, TAKE ONE TAB BY MOUTH ONCE DAILY. TAKE ALONG WITH 200MG FOR A TOTAL DAILY DOSE OF 250MG, Disp: , Rfl: 3     tiZANidine (ZANAFLEX) 4 MG tablet, Take 4 mg by mouth, Disp: , Rfl:      topiramate (TOPAMAX) 50 MG tablet, TAKE 1 TAB BY MOUTH EVERY MORNING AND 2 TAB BY MOUTH NIGHTLY, Disp: , Rfl: 2     traMADol (ULTRAM) 50 MG tablet, TAKE 2 TABLETS BY MOUTH TWICE A DAY, Disp: , Rfl: 2     vitamin D3 (CHOLECALCIFEROL) 2000 units tablet, Take 1 tablet by mouth, Disp: , Rfl:      nitroGLYcerin (NITROSTAT) 0.4 MG sublingual tablet, Place 0.4 mg under the tongue every 5 minutes as needed , Disp: , Rfl:     FAMILY HISTORY:   No family history on file.       ROS:    No fevers or chills  No weight loss  No sore throat  No shortness of breath or wheezing  No chest pain or pressure  No rashes or skin changes  No odynophagia or dysphagia  No BRBPR, hematochezia, melena  No dysuria, frequency or urgency  No hot/cold intolerance or polyria    PHYSICAL EXAMINATION:  Constitutional: aaox3, cooperative, pleasant, not dyspneic/diaphoretic, no acute distress  Vitals reviewed: /77 (BP Location: Left arm, Patient Position: Sitting, Cuff Size: Adult Regular)   Pulse 94   Ht 1.524 m (5')   Wt 97.2 kg (214 lb 4.8 oz)   SpO2 96%   BMI 41.85 kg/m     Wt:   Wt Readings from Last 2 Encounters:   05/22/19 97.2 kg (214 lb  4.8 oz)   03/05/19 97.1 kg (214 lb)          Eyes: Sclera anicteric/injected  CV: No edema  Respiratory: Unlabored breathing  Abd: Nondistended, no masses, +bs, no hepatosplenomegaly, nontender, no peritoneal signs  Skin: warm, perfused, no jaundice  Psych: Normal affect  MSK: ambulates with cane      PERTINENT STUDIES:   Most recent CBC:  Recent Labs   Lab Test 03/05/19  1514   WBC 15.1*   HGB 14.5   HCT 45.1        Most recent hepatic panel:  Recent Labs   Lab Test 03/05/19  1514 12/18/18   ALT 26 28   AST 11 11*     Most recent creatinine:  Recent Labs   Lab Test 03/05/19  1514 12/18/18   CR 0.94 1.09*       RADIOLOGY:    EXAMINATION: US ABDOMEN COMPLETE, 3/12/2019 7:16 AM      COMPARISON: None     HISTORY: Postprandial pain     TECHNIQUE: The abdomen was scanned in standard fashion with  specialized ultrasound transducer(s) using both grey scale and limited  color Doppler techniques.     Findings:  Liver: The liver demonstrates normal homogeneous echotexture. No  evidence of a focal hepatic mass or intrahepatic biliary ductal  dilatation. The main portal vein is patent with antegrade flow,  measuring 1.2 cm.     Gallbladder: The gallbladder is well distended and of normal  morphology. There is no wall thickening, pericholecystic fluid,  sonographic Nunez's sign nor evidence for cholelithiasis.     Bile Ducts: Both the intra- and extrahepatic biliary system are of  normal caliber.  The common bile duct measures 4 mm in diameter.     Pancreas: Visualized portions of the head and body of the pancreas are  unremarkable.      Kidneys: Both kidneys are of normal echotexture, without mass nor  hydronephrosis.   The right kidney is inferiorly situated and a pelvic  kidney. The craniocaudal dimensions are: right- 10 cm, left- 11.2 cm.     Spleen: The spleen is normal in size,  measuring 11.1 cm in sagittal  dimension.     Aorta and IVC:  The visualized portions of the aorta and IVC are  unremarkable.  Fluid: No  evidence of ascites or pleural effusions.                                                                         Impression:   1.  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 Etiology of pain  not identified.     VALARIE ARENAS MD        ASSESSMENT/PLAN:    Kaylah Arreola is a 53 year old female who presents for follow up. She was initially seen on 3/5/19 for 6 months of abdominal pain.  Pain was occurring about 20 minutes after eating and could last for several hours at a time.  Associated symptoms included nausea and diarrhea.  Evaluation prior to her initial office visit included blood work, CT scan and urinalysis which overall did not explain her symptoms. Dr. Owens further evaluated with stool studies which were negative including fecal calprotectin, H. pylori, C. difficile and ova and parasites.  Her labs including CBC, CMP, pancreatic enzymes and abdominal sonogram were also unremarkable.  She was suspected to have dyspepsia or irritable bowel syndrome.  She was referred to nutrition to determine any food triggers and make any necessary dietary changes.  She is meeting with our nutritionist she has removed gluten, dairy and sugar from her diet and has noted significant improvement.  She is no longer having postprandial abdominal pain.  She also notes improvement in consistency and frequency of her stool.  She is having four well formed bowel movements a day without diarrhea.  She is very pleased with her improvement.  The improvement in her symptoms after removing gluten from her diet raises concern/question for celiac disease.  I recommended that she continue her gluten-free diet at this time as per instruction of her nutritionist however if gluten is to be reintroduced her diet would be important to test for celiac disease at that time.  Future orders placed for tissue transglutaminase antibodies. She understands that she can return for testing when on gluten containing diet for several weeks.  We will  also be following up with her in 4 months to check her progress.  We discussed checking for celiac disease based off her diet at that time as well (if not already checked by then).        Follow up in 4 months or sooner if needed.     Generalized postprandial abdominal pain  Diarrhea, unspecified type    Orders Placed This Encounter   Procedures     Tissue transglutaminase alicaj IgA and IgG     IgA           Thank you for this consultation.  It was a pleasure to participate in the care of this patient; please contact us with any further questions.      This note was created with voice recognition software, and while reviewed for accuracy, typos may remain.     Gena Celestin PA-C  Gastroenterology   [facility]

## 2019-09-09 DIAGNOSIS — R10.13 ABDOMINAL PAIN, EPIGASTRIC: ICD-10-CM

## 2019-09-09 DIAGNOSIS — R10.13 DYSPEPSIA: ICD-10-CM

## 2019-09-09 NOTE — TELEPHONE ENCOUNTER
Faxed refill request from: [facility] pharmacy  Medication request: Omeprazole 20mg capsule  Sig: take one capsule by mouth every day  Last filled: 7/11/2019  Last Qty: 30  Pt's last office visit: 5/22/2019  Next scheduled office visit: 9/25/2019    Rx pended and routed to RN[facility] for review and approval if appropriate.    YOBANI Matthews

## 2020-01-27 ENCOUNTER — MEDICAL CORRESPONDENCE (OUTPATIENT)
Dept: HEALTH INFORMATION MANAGEMENT | Facility: CLINIC | Age: 55
End: 2020-01-27

## 2020-08-27 DIAGNOSIS — R10.13 DYSPEPSIA: ICD-10-CM

## 2020-08-27 DIAGNOSIS — R10.13 ABDOMINAL PAIN, EPIGASTRIC: ICD-10-CM

## 2020-09-01 NOTE — TELEPHONE ENCOUNTER
Last Clinic Visit: 5/22/2019 [facility]  *plan RTC 4 months      Appointment past due: selin refill 90 days and staff message sent to [facility] scheduling.

## 2020-09-01 NOTE — TELEPHONE ENCOUNTER
9/1 Called and spoke to patient. She is currently scheduled for virtual follow up.     Keila Heard   Procedure    Ortho/Sports Med/Pod/Ent/Eye/Surgical Specialties  [facility] [place]   [phone]

## 2020-09-03 ENCOUNTER — VIRTUAL VISIT (OUTPATIENT)
Dept: GASTROENTEROLOGY | Facility: CLINIC | Age: 55
End: 2020-09-03
Payer: MEDICARE

## 2020-09-03 DIAGNOSIS — K21.9 GASTROESOPHAGEAL REFLUX DISEASE, ESOPHAGITIS PRESENCE NOT SPECIFIED: Primary | ICD-10-CM

## 2020-09-03 PROCEDURE — 99213 OFFICE O/P EST LOW 20 MIN: CPT | Mod: 95 | Performed by: PHYSICIAN ASSISTANT

## 2020-09-03 RX ORDER — LEVOTHYROXINE SODIUM 125 UG/1
125 TABLET ORAL DAILY
COMMUNITY

## 2020-09-03 RX ORDER — ONDANSETRON 4 MG/1
1 TABLET, ORALLY DISINTEGRATING ORAL 2 TIMES DAILY PRN
COMMUNITY
Start: 2020-08-26

## 2020-09-03 NOTE — PROGRESS NOTES
"Kaylah Arreola is a 55 year old female who is being evaluated via a billable video visit.      The patient has been notified of following:     \"This video visit will be conducted via a call between you and your physician/provider. We have found that certain health care needs can be provided without the need for an in-person physical exam.  This service lets us provide the care you need with a video conversation.  If a prescription is necessary we can send it directly to your pharmacy.  If lab work is needed we can place an order for that and you can then stop by our lab to have the test done at a later time.    Video visits are billed at different rates depending on your insurance coverage.  Please reach out to your insurance provider with any questions.    If during the course of the call the physician/provider feels a video visit is not appropriate, you will not be charged for this service.\"    Patient has given verbal consent for Video visit? Yes  How would you like to obtain your AVS? Mail a copy  If you are dropped from the video visit, the video invite should be resent to: Send to e-mail at: [email or web address]  Will anyone else be joining your video visit? No       Susan Vergara LPN      Video-Visit Details    Type of service:  Video Visit    Video Start Time: 9:32 AM  Video End Time: 9:42 AM    Originating Location (pt. Location): Home    Distant Location (provider location):  [facility]     Platform used for Video Visit: Esperanza Celestin PA-C        GASTROENTEROLOGY FOLLOW UP VIDEO VISIT         CC/REFERRING MD:    Zhang Roberto    REASON FOR CONSULTATION:  RECHECK (Follow up; discuss medication; Omeprazole is not helping)      HISTORY OF PRESENT ILLNESS:    Kaylah Arreloa is 55 year old female who presents for follow up of reflux. She is currently on omeprazole 20mg once daily which was initially working well for her reflux. More recently however she notes that she is having " pain with eating and having nausea as well as more prominent heartburn. She denies NSAID use. She denies any vomiting. No dysphagia. No changes in her bowel habits or weight.     She does not any other GI concerns today.       PERTINENT PAST MEDICAL HISTORY:  (I personally reviewed this history with the patient at today's visit)   Past Medical History:   Diagnosis Date     Anxiety      AV block, 3rd degree (H)      Depression      Fibromyalgia      GERD (gastroesophageal reflux disease)      Syncope, cardiogenic          PREVIOUS SURGERIES: (I personally reviewed this history with the patient at today's visit)   Past Surgical History:   Procedure Laterality Date     EP PACEMAKER       HYSTERECTOMY           ALLERGIES:     Allergies   Allergen Reactions     Codeine Nausea     Other reaction(s): GI Upset, Vomiting  headaches       Hydrocodone-Acetaminophen Nausea and Nausea and Vomiting     Other reaction(s): Headache, Vomiting  headache       Metronidazole Unknown     Midodrine      Bloating.     Phenytoin Unknown     Other reaction(s): *Unknown     Ranitidine      sensitivity  Severe depression       Clarithromycin Rash     Other reaction(s): Vomiting     Metoclopramide Rash     depression    Other reaction(s): Emotional Disturbance  depression     Sulfa Drugs Hives and Rash       PERTINENT MEDICATIONS:    Current Outpatient Medications:      atorvastatin (LIPITOR) 20 MG tablet, Take 20 mg by mouth daily , Disp: , Rfl:      buPROPion (WELLBUTRIN SR) 200 MG 12 hr tablet, Take 1 tablet by mouth daily , Disp: , Rfl:      hydroxychloroquine (PLAQUENIL) 200 MG tablet, TAKE 1 TABLET BY MOUTH TWICE A DAY, Disp: , Rfl: 1     levothyroxine (SYNTHROID/LEVOTHROID) 112 MCG tablet, Take 112 mcg by mouth daily , Disp: , Rfl:      levothyroxine (SYNTHROID/LEVOTHROID) 125 MCG tablet, Take 125 mcg by mouth daily, Disp: , Rfl:      liothyronine (CYTOMEL) 5 MCG tablet, Take 5 mcg by mouth daily, Disp: , Rfl:      loratadine (CLARITIN)  10 MG tablet, Take 10 mg by mouth daily, Disp: , Rfl:      LORazepam (ATIVAN) 1 MG tablet, Take 1 mg by mouth Take 1 tablet during the day and Take 3 tabs at 8pm, Disp: , Rfl:      metoprolol succinate ER (TOPROL-XL) 25 MG 24 hr tablet, Take 25 mg by mouth 2 times daily , Disp: , Rfl: 3     nitroGLYcerin (NITROSTAT) 0.4 MG sublingual tablet, Place 0.4 mg under the tongue every 5 minutes as needed , Disp: , Rfl:      omeprazole (PRILOSEC) 20 MG DR capsule, Take 1 capsule (20 mg) by mouth daily, Disp: 90 capsule, Rfl: 0     ondansetron (ZOFRAN-ODT) 4 MG ODT tab, Take 1 tablet by mouth 2 times daily as needed, Disp: , Rfl:      sertraline (ZOLOFT) 100 MG tablet, Take 200 mg by mouth daily , Disp: , Rfl:      tiZANidine (ZANAFLEX) 4 MG tablet, Take 4 mg by mouth 2 times daily , Disp: , Rfl:      topiramate (TOPAMAX) 50 MG tablet, TAKE 1 TAB BY MOUTH EVERY MORNING AND 2 TAB BY MOUTH NIGHTLY, Disp: , Rfl: 2     vitamin D3 (CHOLECALCIFEROL) 2000 units tablet, Take 1 tablet by mouth daily , Disp: , Rfl:      risperiDONE (RISPERDAL) 3 MG tablet, Take 1.5 mg by mouth, Disp: , Rfl:      sertraline (ZOLOFT) 50 MG tablet, TAKE ONE TAB BY MOUTH ONCE DAILY. TAKE ALONG WITH 200MG FOR A TOTAL DAILY DOSE OF 250MG, Disp: , Rfl: 3     traMADol (ULTRAM) 50 MG tablet, TAKE 2 TABLETS BY MOUTH TWICE A DAY, Disp: , Rfl: 2    SOCIAL HISTORY:  Social History     Socioeconomic History     Marital status:      Spouse name: Not on file     Number of children: Not on file     Years of education: Not on file     Highest education level: Not on file   Occupational History     Not on file   Social Needs     Financial resource strain: Not on file     Food insecurity     Worry: Not on file     Inability: Not on file     Transportation needs     Medical: Not on file     Non-medical: Not on file   Tobacco Use     Smoking status: Former Smoker     Smokeless tobacco: Never Used   Substance and Sexual Activity     Alcohol use: Not on file     Drug  use: Not on file     Sexual activity: Not on file   Lifestyle     Physical activity     Days per week: Not on file     Minutes per session: Not on file     Stress: Not on file   Relationships     Social connections     Talks on phone: Not on file     Gets together: Not on file     Attends [employer] service: Not on file     Active member of club or organization: Not on file     Attends meetings of clubs or organizations: Not on file     Relationship status: Not on file     Intimate partner violence     Fear of current or ex partner: Not on file     Emotionally abused: Not on file     Physically abused: Not on file     Forced sexual activity: Not on file   Other Topics Concern     Parent/sibling w/ CABG, MI or angioplasty before 65F 55M? Not Asked   Social History Narrative     Not on file       FAMILY HISTORY: (I personally reviewed this history with the patient at today's visit)  No family history on file.     ROS:    No fevers or chills  No weight loss  No blurry vision, double vision or change in vision  No sore throat  No lymphadenopathy  No headache, paraesthesias, or weakness in a limb  No shortness of breath or wheezing  No chest pain or pressure  No arthralgias or myalgias  No rashes or skin changes  No odynophagia or dysphagia  No BRBPR, hematochezia, melena  No dysuria, frequency or urgency  No hot/cold intolerance or polyria  No anxiety or depression      PHYSICAL EXAMINATION:  Constitutional: aaox3, cooperative, pleasant, not dyspneic/diaphoretic, no acute distress, morbidly obese       GENERAL: Healthy, alert and no distress  EYES: Eyes grossly normal to inspection.  No discharge or erythema, or obvious scleral/conjunctival abnormalities.  RESP: No audible wheeze, cough, or visible cyanosis.  No visible retractions or increased work of breathing.    SKIN: Visible skin clear. No significant rash, abnormal pigmentation or lesions.  NEURO: Cranial nerves grossly intact.  Mentation and speech appropriate for  age.  PSYCH: Mentation appears normal, affect normal/bright, judgement and insight intact, normal speech        ASSESSMENT/PLAN:    Kaylah Arreola is a 55 year old female who presents for evaluation of worsening reflux. Her reflux was well managed with omeprazole 20 mg once daily until more recently. She is now noting symptoms often after eating including reflux and nausea. There is no vomiting. No change in bowel habits or weight. Recommended we increase her omeprazole to 40mg daily for better control. Advised patient to avoid acidic foods/drinks. Eat smaller portions, avoid over eating or eating late near bedtime.     Follow up in 3 months, sooner if needed.       Gastroesophageal reflux disease, esophagitis presence not specified      Gena Celestin PA-C  Gastroenterology  [facility]

## 2020-09-03 NOTE — PATIENT INSTRUCTIONS
Increase omeprazole to 40mg daily on an empty stomach about 30 minutes before breakfast.     Reflux/heartburn/GERD relief:  1) Prop actual bed up, not just with pillows.  Propping up with just pillows can actually make things worse if it is causing you to bend at the waist while sleeping  2) Avoid alcohol, as this causes relaxation of the sphincter and makes it easier for food to travel up esophagus.  If you do drink alcohol, do not drink before bed or before meals.  3)  Eat small meals. Avoid overeating.  4) Avoid triggers such as fatty foods, processed foods, and high fructose corn syrup (or food that contain these)

## 2020-09-09 ENCOUNTER — TELEPHONE (OUTPATIENT)
Dept: GASTROENTEROLOGY | Facility: CLINIC | Age: 55
End: 2020-09-09

## 2020-09-09 NOTE — LETTER
November 9, 2020      Kaylah Arreola  [address]  [place] [address]        Dear Kaylah Arreola,    In order to ensure that we are providing the best quality care, we would like to remind you that you are due for a virtual return appointment with Gena Celestin around 12/3/2020.      We have been unable to reach you by phone. Please call your clinic or use [facility] to make an appointment with your provider before you run out of medication.  Please let us know if you have any questions and we would be happy to help.     Thank you for trusting us with your care.    Sincerely,     [facility]  [phone]

## 2020-09-09 NOTE — TELEPHONE ENCOUNTER
9/9 Provided phone number [phone] to schedule in about 3 months (around 12/3/2020) for Phone Visit, Video Visit.    Keila Heard   Procedure    Ortho/Sports Med/Pod/Ent/Eye/Surgical Specialties  [facility] [place]   [phone]

## 2020-10-09 NOTE — TELEPHONE ENCOUNTER
10/9 2nd attempt.  Provided phone number [phone] to schedule in about 3 months (around 12/3/2020) for Phone Visit, Video Visit.     Keila Heard    Procedure    Ortho/Sports Med/Pod/Ent/Eye/Surgical Specialties  [facility] [place]   [phone]

## 2020-11-09 NOTE — TELEPHONE ENCOUNTER
3rd attempt. Patient has not called to schedule.  Appointment reminder letter sent to patient.      Karime Peña  Surgical Specialties Procedure   [facility] [place]  11/9/2020 7:59 AM

## 2020-11-16 DIAGNOSIS — K21.9 ACID REFLUX: ICD-10-CM

## 2020-11-16 DIAGNOSIS — K21.9 GASTROESOPHAGEAL REFLUX DISEASE, UNSPECIFIED WHETHER ESOPHAGITIS PRESENT: Primary | ICD-10-CM

## 2020-12-08 ENCOUNTER — VIRTUAL VISIT (OUTPATIENT)
Dept: GASTROENTEROLOGY | Facility: CLINIC | Age: 55
End: 2020-12-08
Payer: MEDICARE

## 2020-12-08 DIAGNOSIS — K21.9 GASTROESOPHAGEAL REFLUX DISEASE, UNSPECIFIED WHETHER ESOPHAGITIS PRESENT: Primary | ICD-10-CM

## 2020-12-08 DIAGNOSIS — R11.0 NAUSEA: ICD-10-CM

## 2020-12-08 PROCEDURE — 99442 PR PHYSICIAN TELEPHONE EVALUATION 11-20 MIN: CPT | Mod: 95 | Performed by: PHYSICIAN ASSISTANT

## 2020-12-08 RX ORDER — PANTOPRAZOLE SODIUM 40 MG/1
40 TABLET, DELAYED RELEASE ORAL EVERY MORNING
Qty: 90 TABLET | Refills: 1 | Status: SHIPPED | OUTPATIENT
Start: 2020-12-08 | End: 2021-01-21

## 2020-12-08 RX ORDER — ONDANSETRON 4 MG/1
4 TABLET, FILM COATED ORAL EVERY 8 HOURS PRN
Qty: 30 TABLET | Refills: 0 | Status: SHIPPED | OUTPATIENT
Start: 2020-12-08 | End: 2021-03-11

## 2020-12-08 RX ORDER — BUSPIRONE HYDROCHLORIDE 10 MG/1
10 TABLET ORAL DAILY
COMMUNITY
Start: 2020-11-24

## 2020-12-08 NOTE — PATIENT INSTRUCTIONS
Stop omeprazole. Start taking protonix (pantoprazole) daily instead.     Please schedule your upper endoscopy for further evaluation. If you have not heard from the scheduling office within 2 business days, please call [phone].     Let's plan to follow up about 2-3 weeks after your upper endoscopy to review results and discuss next steps if any are needed.

## 2020-12-08 NOTE — PROGRESS NOTES
"Kaylah Arreola is a 55 year old female who is being evaluated via a billable video visit.      The patient has been notified of following:     \"This video visit will be conducted via a call between you and your physician/provider. We have found that certain health care needs can be provided without the need for an in-person physical exam.  This service lets us provide the care you need with a video conversation.  If a prescription is necessary we can send it directly to your pharmacy.  If lab work is needed we can place an order for that and you can then stop by our lab to have the test done at a later time.    Video visits are billed at different rates depending on your insurance coverage.  Please reach out to your insurance provider with any questions.    If during the course of the call the physician/provider feels a video visit is not appropriate, you will not be charged for this service.\"    Patient has given verbal consent for Video visit? Yes  How would you like to obtain your AVS? Mail a copy  If you are dropped from the video visit, the video invite should be resent to: Text to cell phone: [phone]  Will anyone else be joining your video visit? No       Susan Vergara LPN        switched to telephone encounter due to connectivity issues.     Phone call duration: 12 minutes       GASTROENTEROLOGY FOLLOW UP TELEPHONE VISIT         CC/REFERRING MD:    Zhang Roberto      REASON FOR CONSULTATION:  RECHECK (Follow up)        HISTORY OF PRESENT ILLNESS:    Kaylah Arreola is 55 year old female who presents for follow up of worsening reflux. At last visit 3 months ago she was noting worsening reflux despite omeprazole 20mg daily. We therefore increased her omeprazole to 40mg daily. She notes only mild improvement in symptoms with increased dose. She continues to have acid reflux daily or every other day. She also is experiencing nausea as well. She also notes an abdominal pain that she describes as a burning " sensation. There is no vomiting. She denies NSAID use. No changes in bowel habits or weight.         ASSESSMENT/PLAN:    Kaylah Arreola is a 55 year old female who presents for follow up. She has noted worsening reflux in the past several months. Symptoms continue despite increase in omeprazole from 20mg to 40mg daily. Initially I was planning to add in H2 blocker daily however she is noted to have an allergic response to ranitidine in the past. Therefore will avoid H2 blockers. Instead will switch her omeprazole to protonix daily. Also will further eval with an EGD. Will give zofran at this time for nausea.     Further recommendations after work up.       Gastroesophageal reflux disease, unspecified whether esophagitis present  - pantoprazole (PROTONIX) 40 MG EC tablet  Dispense: 90 tablet; Refill: 1  Nausea  - ondansetron (ZOFRAN) 4 MG tablet  Dispense: 30 tablet; Refill: 0          Gena Celestin PA-C  Gastroenterology  [facility]

## 2020-12-09 ENCOUNTER — TELEPHONE (OUTPATIENT)
Dept: GASTROENTEROLOGY | Facility: CLINIC | Age: 55
End: 2020-12-09

## 2020-12-09 NOTE — TELEPHONE ENCOUNTER
Patient is scheduled for EGD with Dr. BECKHAM    Spoke with: SCHEDULING STAFF IN     Date of Procedure: 01/07/2021    Location: UNIT J    Sedation Type CS    Pre-op for Unit J MAC NOT NEEDED    (if yes advise patient they will need a pre-op prior to procedure)      Is patient on blood thinners? -NO (If yes- inform patient to follow up with PCP or provider for follow up instructions)     Informed patient they will need an adult  YES    Informed Patient of COVID Test Requirement YES    Preferred Pharmacy for Pre Prescription UNKNOWN    Confirmed Nurse will call to complete assessment YES    Additional comments: NA

## 2020-12-10 DIAGNOSIS — Z11.59 ENCOUNTER FOR SCREENING FOR OTHER VIRAL DISEASES: Primary | ICD-10-CM

## 2021-01-04 DIAGNOSIS — Z11.59 ENCOUNTER FOR SCREENING FOR OTHER VIRAL DISEASES: ICD-10-CM

## 2021-01-04 LAB
SARS-COV-2 RNA SPEC QL NAA+PROBE: NORMAL
SPECIMEN SOURCE: NORMAL

## 2021-01-04 PROCEDURE — U0003 INFECTIOUS AGENT DETECTION BY NUCLEIC ACID (DNA OR RNA); SEVERE ACUTE RESPIRATORY SYNDROME CORONAVIRUS 2 (SARS-COV-2) (CORONAVIRUS DISEASE [COVID-19]), AMPLIFIED PROBE TECHNIQUE, MAKING USE OF HIGH THROUGHPUT TECHNOLOGIES AS DESCRIBED BY CMS-2020-01-R: HCPCS | Performed by: INTERNAL MEDICINE

## 2021-01-04 PROCEDURE — U0005 INFEC AGEN DETEC AMPLI PROBE: HCPCS | Performed by: INTERNAL MEDICINE

## 2021-01-05 LAB
LABORATORY COMMENT REPORT: NORMAL
SARS-COV-2 RNA SPEC QL NAA+PROBE: NEGATIVE
SPECIMEN SOURCE: NORMAL

## 2021-01-07 ENCOUNTER — HOSPITAL ENCOUNTER (OUTPATIENT)
Facility: CLINIC | Age: 56
Discharge: HOME | End: 2021-01-07
Attending: INTERNAL MEDICINE | Admitting: INTERNAL MEDICINE
Payer: MEDICARE

## 2021-01-07 VITALS
OXYGEN SATURATION: 95 % | DIASTOLIC BLOOD PRESSURE: 68 MMHG | RESPIRATION RATE: 26 BRPM | HEART RATE: 66 BPM | SYSTOLIC BLOOD PRESSURE: 115 MMHG

## 2021-01-07 LAB — UPPER GI ENDOSCOPY: NORMAL

## 2021-01-07 PROCEDURE — 88305 TISSUE EXAM BY PATHOLOGIST: CPT | Mod: 26 | Performed by: PATHOLOGY

## 2021-01-07 PROCEDURE — 93005 ELECTROCARDIOGRAM TRACING: CPT

## 2021-01-07 PROCEDURE — 43239 EGD BIOPSY SINGLE/MULTIPLE: CPT | Performed by: INTERNAL MEDICINE

## 2021-01-07 PROCEDURE — 43235 EGD DIAGNOSTIC BRUSH WASH: CPT | Performed by: INTERNAL MEDICINE

## 2021-01-07 PROCEDURE — G0500 MOD SEDAT ENDO SERVICE >5YRS: HCPCS | Performed by: INTERNAL MEDICINE

## 2021-01-07 PROCEDURE — 88305 TISSUE EXAM BY PATHOLOGIST: CPT | Mod: TC | Performed by: INTERNAL MEDICINE

## 2021-01-07 PROCEDURE — 250N000011 HC RX IP 250 OP 636: Performed by: INTERNAL MEDICINE

## 2021-01-07 PROCEDURE — 250N000009 HC RX 250: Performed by: INTERNAL MEDICINE

## 2021-01-07 PROCEDURE — 93010 ELECTROCARDIOGRAM REPORT: CPT | Performed by: INTERNAL MEDICINE

## 2021-01-07 RX ORDER — NALOXONE HYDROCHLORIDE 0.4 MG/ML
0.4 INJECTION, SOLUTION INTRAMUSCULAR; INTRAVENOUS; SUBCUTANEOUS
Status: CANCELLED | OUTPATIENT
Start: 2021-01-07 | End: 2021-01-08

## 2021-01-07 RX ORDER — NALOXONE HYDROCHLORIDE 0.4 MG/ML
0.2 INJECTION, SOLUTION INTRAMUSCULAR; INTRAVENOUS; SUBCUTANEOUS
Status: CANCELLED | OUTPATIENT
Start: 2021-01-07 | End: 2021-01-08

## 2021-01-07 RX ORDER — PROCHLORPERAZINE MALEATE 10 MG
10 TABLET ORAL EVERY 6 HOURS PRN
Status: CANCELLED | OUTPATIENT
Start: 2021-01-07

## 2021-01-07 RX ORDER — ONDANSETRON 2 MG/ML
INJECTION INTRAMUSCULAR; INTRAVENOUS PRN
Status: DISCONTINUED | OUTPATIENT
Start: 2021-01-07 | End: 2021-01-07 | Stop reason: HOSPADM

## 2021-01-07 RX ORDER — FLUMAZENIL 0.1 MG/ML
0.2 INJECTION, SOLUTION INTRAVENOUS
Status: CANCELLED | OUTPATIENT
Start: 2021-01-07 | End: 2021-01-08

## 2021-01-07 RX ORDER — ONDANSETRON 2 MG/ML
4 INJECTION INTRAMUSCULAR; INTRAVENOUS
Status: DISCONTINUED | OUTPATIENT
Start: 2021-01-07 | End: 2021-01-07 | Stop reason: HOSPADM

## 2021-01-07 RX ORDER — ONDANSETRON 4 MG/1
4 TABLET, ORALLY DISINTEGRATING ORAL EVERY 6 HOURS PRN
Status: CANCELLED | OUTPATIENT
Start: 2021-01-07

## 2021-01-07 RX ORDER — ONDANSETRON 2 MG/ML
4 INJECTION INTRAMUSCULAR; INTRAVENOUS EVERY 6 HOURS PRN
Status: CANCELLED | OUTPATIENT
Start: 2021-01-07

## 2021-01-07 RX ORDER — LIDOCAINE 40 MG/G
CREAM TOPICAL
Status: DISCONTINUED | OUTPATIENT
Start: 2021-01-07 | End: 2021-01-07 | Stop reason: HOSPADM

## 2021-01-07 NOTE — PROGRESS NOTES
Post procedure EKG demonstrated paced rhythm with HR at 70 and no other changes.     She felt fine without issues. She did not take morning metoprolol, which may have explained the SVT/tachycardia. I asked her to take metoprolol when she got home.     Rufino Hoover MD    [facility]  Division of Gastroenterology  [phone]

## 2021-01-07 NOTE — OR NURSING
Pt underwent EGD with biopsies under conscious sedation. Specimens sent to lab. Pt paced but experiencing artifact vs arrhythmia during procedure. Palpated pulse feels regular in 70s. EKG ordered by MD following procedure.       Nery Vega RN

## 2021-01-08 LAB
COPATH REPORT: NORMAL
INTERPRETATION ECG - MUSE: NORMAL

## 2021-01-21 ENCOUNTER — VIRTUAL VISIT (OUTPATIENT)
Dept: GASTROENTEROLOGY | Facility: CLINIC | Age: 56
End: 2021-01-21
Payer: MEDICARE

## 2021-01-21 DIAGNOSIS — K21.00 GASTROESOPHAGEAL REFLUX DISEASE WITH ESOPHAGITIS WITHOUT HEMORRHAGE: Primary | ICD-10-CM

## 2021-01-21 DIAGNOSIS — R10.10 UPPER ABDOMINAL PAIN: ICD-10-CM

## 2021-01-21 DIAGNOSIS — K20.80 LOS ANGELES GRADE A ESOPHAGITIS: ICD-10-CM

## 2021-01-21 DIAGNOSIS — K22.70 BARRETT'S ESOPHAGUS DETERMINED BY BIOPSY: ICD-10-CM

## 2021-01-21 PROCEDURE — 99442 PR PHYSICIAN TELEPHONE EVALUATION 11-20 MIN: CPT | Mod: 95 | Performed by: PHYSICIAN ASSISTANT

## 2021-01-21 RX ORDER — PANTOPRAZOLE SODIUM 40 MG/1
40 TABLET, DELAYED RELEASE ORAL 2 TIMES DAILY
Qty: 180 TABLET | Refills: 0 | Status: SHIPPED | OUTPATIENT
Start: 2021-01-21 | End: 2021-03-30

## 2021-01-21 NOTE — Clinical Note
Please send today's lab and sono orders to [facility] per patient request and notify patient once completed. Thank you! - Gena

## 2021-01-21 NOTE — PATIENT INSTRUCTIONS
Please increase your protonix (pantoprazole) to twice daily (30 minutes before a meal).     Please complete blood work and an abdominal ultrasound/sonogram at your earliest convenience. Orders will be sent to [facility] in [place] as you requested.

## 2021-01-21 NOTE — PROGRESS NOTES
Kaylah Arreola is a 55 year old who is being evaluated via a billable telephone visit.      What phone number would you like to be contacted at?[phone]  How would you like to obtain your AVS? Pio Emanuel     Kaylah Arreola is a 55 year old who presents via telephone visit for follow up.     We last visited in December 2020 for worsening reflux.  At that time she continued to have reflux despite 3 months of omeprazole 40 mg. She was therefore switched from omeprazole to protonix and evaluated further with an EGD. EGD noted reflux esophagitis but was otherwise unremarkable. Esophageal biopsy noted barretts esophagus and increased eosinophils thought to be consistent with eosinophilic esophagitis.  Gastric biopsies revealed mild chronic inflammation but no H. Pylori.     She continues to have an upper abdominal pain along with nausea.  Nausea has made it difficult for her to eat.  She is doubling over in pain anytime she does eat.  She also continues to have significant reflux symptoms.          Objective         Upper Endoscopy 1/7/2021  Impression: Mild reflux esophagitis- biopsied. Biopsied to rule out                        H pylori. No endoscopic findings during today's exam to                        account for severe symptoms. Patient became abruptly                        tachycardic () at end of procedure which                        terminated with Valsalva maneuver.                        - LA Grade A reflux esophagitis. Biopsied.                        - Z-line regular, 36 cm from the incisors.                        - Gastroesophageal flap valve classified as Hill Grade                        III (minimal fold, loose to endoscope, hiatal hernia                        likely).                        - Normal stomach. Biopsied.                        - Normal duodenal bulb and second portion of the                        duodenum.                        - The examination was otherwise  normal.     FINAL DIAGNOSIS:   A. STOMACH, BIOPSIES:   - Gastric body and antral type mucosa with mild chronic inflammation   - No H. pylori like organisms identified on routine staining   - Negative for intestinal metaplasia or dysplasia     B. ESOPHAGUS, BIOPSY:   - Metaplastic columnar epithelium with goblet cells, consistent with Constantino's esophagus; negative for dysplasia   - Squamous epithelium with increased intraepithelial eosinophils (up to 50 per high power field), consistent   with eosinophilic esophagitis     I have personally reviewed all specimens and/or slides, including the listed special stains, and used them   with my medical judgement to determine or confirm the final diagnosis.     Electronically signed out by:     Rosa Bradford M.D., [facility]         Vitals:  No vitals were obtained today due to virtual visit.    Physical Exam   alert and cooperative  PSYCH: Alert and oriented times 3; coherent speech, normal   rate and volume, able to articulate logical thoughts, able   to abstract reason, no tangential thoughts, no hallucinations   or delusions  Her affect is normal  RESP: No cough, no audible wheezing, able to talk in full sentences  Remainder of exam unable to be completed due to telephone visits        Assessment & Plan     Gastroesophageal reflux disease with esophagitis without hemorrhage  Garner grade A esophagitis  Constantino's esophagus determined by biopsy  Upper abdominal pain    Orders placed today:   - pantoprazole (PROTONIX) 40 MG EC tablet  Dispense: 180 tablet; Refill: 0  - Comprehensive metabolic panel  - CBC with platelets differential  - US Abdomen Limited        Kaylah Arreola is a pleasant 55 year old female who presents via telephone visit today for follow-up.  She was recently evaluated with an upper endoscopy for worsening reflux despite PPI 40 mg.  Her upper endoscopy did reveal LA grade reflux esophagitis however did not clear to be consistent with the degree of  her symptoms.  Esophageal biopsy did note Constantino's esophagus and I increased amount of eosinophils concerning for eosinophilic esophagitis.  Her gastric biopsies showed mild chronic inflammation but was otherwise unremarkable.  Negative for H. pylori.  We reviewed her upper endoscopy and biopsy results today.  We addressed this new finding of Constantino's esophagus including diagnosis and mgmt.  Advised that Constantino's esophagus is a precancerous condition that requires surveillance.  For patients without dysplasia surveillance is typically 3 to 5 years.  However would recommend having a repeat upper endoscopy with biopsies in all 4 quadrants within a year and thereafter proceeding with surveillance based off of those biopsy results.     She was also noted to have an increased eosinophilic count on esophageal biopsy concerning for eosinophilic esophagitis however eosinophilis can also be seen with reflux esophagitis. Recommended doubling her PPI dose at this time and consider repeating upper endoscopy in 2 months to reassess.     I will also plan to further evaluate her upper abd pain and nausea with cbc, cmp and abd sono.             Return in about 2 months (around 3/21/2021).    Gena Celestin PA-C  [facility]    Phone call duration: 16 minutes    30 minutes spent on the date of the encounter doing chart review, history and exam, documentation and further activities as noted above

## 2021-01-24 ENCOUNTER — HEALTH MAINTENANCE LETTER (OUTPATIENT)
Age: 56
End: 2021-01-24

## 2021-01-27 ENCOUNTER — MEDICAL CORRESPONDENCE (OUTPATIENT)
Dept: HEALTH INFORMATION MANAGEMENT | Facility: CLINIC | Age: 56
End: 2021-01-27

## 2021-02-04 ENCOUNTER — TELEPHONE (OUTPATIENT)
Dept: GASTROENTEROLOGY | Facility: CLINIC | Age: 56
End: 2021-02-04

## 2021-02-24 ENCOUNTER — MEDICAL CORRESPONDENCE (OUTPATIENT)
Dept: HEALTH INFORMATION MANAGEMENT | Facility: CLINIC | Age: 56
End: 2021-02-24

## 2021-03-10 ENCOUNTER — TRANSFERRED RECORDS (OUTPATIENT)
Dept: HEALTH INFORMATION MANAGEMENT | Facility: CLINIC | Age: 56
End: 2021-03-10

## 2021-03-11 DIAGNOSIS — R11.0 NAUSEA: ICD-10-CM

## 2021-03-11 RX ORDER — ONDANSETRON 4 MG/1
TABLET, FILM COATED ORAL
Qty: 30 TABLET | Refills: 0 | Status: SHIPPED | OUTPATIENT
Start: 2021-03-11 | End: 2021-06-24

## 2021-03-15 ENCOUNTER — TELEPHONE (OUTPATIENT)
Dept: GASTROENTEROLOGY | Facility: CLINIC | Age: 56
End: 2021-03-15

## 2021-03-15 NOTE — TELEPHONE ENCOUNTER
Fax received from [facility] with radiology report - US Abdomen Upper.  Faxed for STAT scanning.      Brenna Cherry RN

## 2021-03-16 NOTE — TELEPHONE ENCOUNTER
abd sono results reviewed and pt notified through my chart message. Thank you  Gena Celestin PA-C  Gastroenterology  [facility]

## 2021-03-30 ENCOUNTER — VIRTUAL VISIT (OUTPATIENT)
Dept: GASTROENTEROLOGY | Facility: CLINIC | Age: 56
End: 2021-03-30
Payer: MEDICARE

## 2021-03-30 DIAGNOSIS — K20.0 EOSINOPHILIC ESOPHAGITIS: Primary | ICD-10-CM

## 2021-03-30 DIAGNOSIS — R13.12 OROPHARYNGEAL DYSPHAGIA: ICD-10-CM

## 2021-03-30 DIAGNOSIS — R63.4 WEIGHT LOSS: ICD-10-CM

## 2021-03-30 DIAGNOSIS — K76.0 FATTY LIVER: ICD-10-CM

## 2021-03-30 DIAGNOSIS — K22.70 BARRETT'S ESOPHAGUS DETERMINED BY BIOPSY: ICD-10-CM

## 2021-03-30 DIAGNOSIS — K21.00 GASTROESOPHAGEAL REFLUX DISEASE WITH ESOPHAGITIS WITHOUT HEMORRHAGE: ICD-10-CM

## 2021-03-30 DIAGNOSIS — Z86.0100 HISTORY OF COLONIC POLYPS: ICD-10-CM

## 2021-03-30 PROCEDURE — 99443 PR PHYSICIAN TELEPHONE EVALUATION 21-30 MIN: CPT | Mod: 95 | Performed by: PHYSICIAN ASSISTANT

## 2021-03-30 RX ORDER — BUDESONIDE 1 MG/2ML
INHALANT ORAL
Qty: 360 ML | Refills: 1 | Status: CANCELLED | OUTPATIENT
Start: 2021-03-30

## 2021-03-30 RX ORDER — BUDESONIDE 0.5 MG/2ML
INHALANT ORAL
Qty: 240 ML | Refills: 1 | Status: SHIPPED | OUTPATIENT
Start: 2021-03-30

## 2021-03-30 RX ORDER — PANTOPRAZOLE SODIUM 40 MG/1
40 TABLET, DELAYED RELEASE ORAL DAILY
Qty: 180 TABLET | Refills: 0 | Status: SHIPPED | OUTPATIENT
Start: 2021-03-30 | End: 2021-04-16

## 2021-03-30 NOTE — PATIENT INSTRUCTIONS
Please decrease your protonix (pantoprazole) back down to once daily dosing.     Start taking budesonide for eosinophilic esophagitis. You will have to mix two ampules of budesonide with 10 packets of splenda and take twice daily so that it can coat your esophagus. Do not eat or drink for 30 minutes after taking.     I am also ordering a video sallow study for your troubles initiating swallows. This can be done at any convenient [facility] location that has imaging and speech therapy. If you have not heard from the scheduling office within 2 business days, please call [phone].     We also discussed fatty liver today and recommended further evaluation with a fibrosis scan. Orders for fibrosis scan have been placed into the system and you should be contacted by scheduling department to help schedule.       Patient Education     Nonalcoholic Fatty Liver Disease (NAFLD)  Nonalcoholic fatty liver disease (NAFLD) is a common disease of the liver. It occurs when you have too much fat in the liver. If NAFLD is severe, it can cause liver damage that seems like the damage caused by drinking too much alcohol. But NAFLD is not caused by drinking alcohol. This sheet tells you more about NAFLD and how it can be managed.     How the liver works   The liver is an organ in the upper right side of the belly (abdomen). It has many important jobs. These include:     Breaking down (metabolizing) proteins, carbohydrates, and fats    Making a substance called bile that helps break down fats    Storing and releasing sugar (glucose) into the blood to give the body energy    Removing toxins from the blood    Helping with blood clotting  Understanding NAFLD  A healthy liver may contain some fat. But if too much fat builds up in the liver, this causes NAFLD. NAFLD can be mild, causing fatty liver. Or it can be more severe and have inflammation as well as excess fat. This can cause non-alcoholic steatohepatitis (HARMAN).     Fatty  liver. With fatty liver, the liver simply has more fat than normal. This extra fat usually does not harm the liver.    HARMAN. With HARMAN, the fatty liver becomes inflamed over time. HARMAN is serious because it can lead to scarring of the liver (fibrosis). Over time, the scarring may lead to cirrhosis of the liver. This can eventually cause liver failure or liver cancer.  Causes and risk factors of NAFLD  Doctors don't know what causes NAFLD. But certain things make the problem more likely to happen. These include:     Obesity    Prediabetes or diabetes    High levels of fat found in the blood (cholesterol and triglycerides)    Taking certain medicines     Having polycystic ovary syndrome (PCOS)  Symptoms of NAFLD  Most people with NAFLD have no symptoms. If symptoms do occur, they can include:    Tiredness    Weakness    Weight loss    Loss of appetite    Nausea and vomiting    Belly pain and cramping    Yellowing of the skin and eyes (jaundice)    Dark urine    Light-colored stools that look gray    Swelling in the belly or legs  Diagnosing NAFLD  Your healthcare provider may think you have NAFLD if routine blood tests show high levels of liver enzymes. This may mean you have a liver problem. You may need 1 or more imaging tests, such as an ultrasound, CT, or MRI. You may need more blood tests to look for other causes of liver disease. You may also need a liver biopsy. During this test, a hollow needle is used to remove a tiny tissue sample from your liver. This tissue is then checked in a lab. This test can find signs of damage to liver tissue. It can also help figure out the cause of the damage and tell the difference between fatty liver and HARMAN.   Treating NAFLD  Treatment for NAFLD varies for each person. The best early treatment is to treat any conditions that are causing metabolic syndrome. This syndrome is a group of conditions that includes:     High blood pressure    High levels of cholesterol and  triglycerides    Being overweight or obese    Diabetes  Your healthcare provider will monitor your health and treat any symptoms or underlying health problems you have. Your provider will also work with you to control your risk factors. This will make liver damage less likely. In fact, treating those underlying conditions can often improve liver disease. You may need to take certain medicines, but no medicine will cure NAFLD. This is why treating the underlying conditions is most important. Your plan may include:     Losing excess weight    Getting regular exercise    Controlling diabetes    Controlling high cholesterol or triglyceride levels    Taking medicines and vitamins as prescribed by your provider    Not smoking    Not drinking alcohol    Eating a healthy diet  Living with NAFLD  If NAFLD is caught early, it can be managed with treatment. Your healthcare provider will discuss further treatment choices with you as needed.   Be sure to ask your provider about recommended vaccines. These include vaccines for viruses that can cause liver disease.   [employer] last reviewed this educational content on 2/1/2020 2000-2020 [employer]. All rights reserved. This information is not intended as a substitute for professional medical care. Always follow your healthcare professional's instructions.

## 2021-03-30 NOTE — PROGRESS NOTES
Kaylah Arreola is a 55 year old who is being evaluated via a billable telephone visit.      What phone number would you like to be contacted at? [phone]  How would you like to obtain your AVS? [facility]   Virtually roomed by: Susan Vergara LPN on 3/30/21 at 9:42 AM      GASTROENTEROLOGY FOLLOW UP TELEPHONE VISIT    CC/REFERRING MD:    Zhang Roberto    REASON FOR VISIT: FOLLOW UP       HISTORY OF PRESENT ILLNESS:    Kaylah Arreola is 55 year old female who presents for follow up.     She was last seen 2 months ago for worsening reflux. Her recent work up includes an upper endoscopy which noted reflux esophagitis with cheung's esophagus and increased eosinophils on biopsies. Since she was not noting improvement with Protonix 40mg daily her rx was increased to protonix 40mg twice daily which she has been taking consistently over the past 2 months. She initially thought this was helping but no longer seeing a benefit. She is having continued troubles with reflux symptoms and nausea. She also reports difficulty with initiating swallow. She has to do a certain maneuver to assist in initiating swallow. No esophageal dysphagia or odynophagia.     She was also recently evaluated with abd wojciech as part of work up for upper abd pain. Her abd sono noted hepatic steatosis but was otherwise unremarkable. Her recent liver enzymes were wnl. She does have hx of morbid obesity. With previous weight reading at 214 lb. She reports weight loss over the last 1-2 years due to pain and reflux. She reports her weight now down to 178 lbs.       PMHx, PSHx, Social Hx, Family Hx have been reviewed.         ASSESSMENT/PLAN:    1. Eosinophilic esophagitis  - budesonide (PULMICORT) 0.5 MG/2ML neb solution; Mix 2 ampules with 10 packets of sucrose (splenda) (for each dose) and take this mixture twice daily for eosinophilic esophagitis. Do not eat or drink for 30 minutes after taking  Dispense: 240 mL; Refill: 1  2. Gastroesophageal reflux  disease with esophagitis without hemorrhage  - pantoprazole (PROTONIX) 40 MG EC tablet; Take 1 tablet (40 mg) by mouth daily On an empty stomach about 30 minutes before a meal  Dispense: 180 tablet; Refill: 0  3. Cheung's esophagus determined by biopsy  4. Oropharyngeal dysphagia  - Speech Therapy Referral; Future  - XR Video Swallow with SLP or OT - Order with Speech Therapy Referral; Future  5. Fatty liver  - Fibrosis Scan; Future  6. Weight loss  7. History of colonic polyps      Kaylah Arreola is a 55 year old female who presents for follow up. She continues to report worsening reflux and nausea symptoms despite protonix 40mg bid. Recommended returning protonix to once daily dosing and adding in oral viscous budesonide for treatment of her EoE. Should plan for repeat upper endoscopy in 2 months to reassess eos count. She also should have biopsies of all 4 quadrants given her diagnosis of cheung's. We have previously reviewed barretts esophagus including diagnosis and management including remaining on PPI and surveillance EGD's.     In regards to oropharyngeal dysphagia, will further eval with a video swallow study. Consider ENT referral.     We also reviewed fatty liver findings. Recommended weight loss through healthy diet and exercise as mainstay of treatment. Avoid hepatotoxins such as alcohol. Will check fibrosis scan at this time.     She is noting weight loss at this time which she attributes to her lack of appetite from her reflux pain and nausea. She reports current weight at 178 lbs which would give her a BMI of ~ 34. Previous BMI at ~41.85 reported in 2019. This is roughly a ~35 lb weight loss in almost 2 years. She however dose report lack of appetite due to her upper GI symptoms. She will be due for colonoscopy soon given hx of polyps. Can plan to schedule EGD and colonoscopy for the same time at follow up. She agrees with this plan.     Return in about 2 months (around 5/30/2021) for Follow up,  using a phone visit.      Gena Celestin PA-C  Gastroenterology  [facility]       Phone call duration: 27 minutes

## 2021-04-01 ENCOUNTER — TELEPHONE (OUTPATIENT)
Dept: GASTROENTEROLOGY | Facility: CLINIC | Age: 56
End: 2021-04-01

## 2021-04-01 DIAGNOSIS — K20.0 EOSINOPHILIC ESOPHAGITIS: Primary | ICD-10-CM

## 2021-04-01 NOTE — TELEPHONE ENCOUNTER
PRIOR AUTHORIZATION DENIED    Medication: budesonide (PULMICORT) 0.5 MG/2ML neb solution-DENIED    Denial Date: 4/1/2021    Denial Rational:           Appeal Information:

## 2021-04-01 NOTE — LETTER
April 5, 2021      Kaylah Arreola  [address]  [place] [facility] [address]        To Whom It May Concern,        I am writing this letter of medical necessity in regards to the recent denial of budesonide suspension. Kaylah Arreola is a pleasant female who unfortunately suffers from Eosiniophilic esophagitis. She has failed previous therapies including high dose proton pump inhibitors.     The next step in therapy for eosinophilic esophagitis is topical glucocorticosteroids such as oral viscous budesonide. Since there is not a formulation designed for this we use budesonide suspension and then create the oral viscous budesonide so that we can properly and adequately treat her condition.     The American Gastroenterology Association and Joint Task force on Allergy-Immunology strongly recommend the use of topical glucorticosteroids over no treatment for eosinophilic esophagitis. Link provided as reference: https://www.gastrojournal.org/article/-0767(11)64803-1/fulltext    We would like to pursue this course of therapy and are requesting that you approve our decision to provide budesonide suspension to our patient, allowing us to provide the best treatment option available. We thank you for your immediate attention to this issue and we would appreciate haste in your determination.         Sincerely,      Gena Celestin PA-C  Gastroenterology  [facility]

## 2021-04-01 NOTE — TELEPHONE ENCOUNTER
[facility] Call Center    Phone Message    May a detailed message be left on voicemail: yes     Reason for Call: The patient was advised by the pharmacy that a different medication code is needed to be able to submit to insurance.  Please advise.  Thank you.     Action Taken: Message routed to:  Adult Clinics: Gastroenterology (GI) p [id number]    Travel Screening: Not Applicable

## 2021-04-01 NOTE — TELEPHONE ENCOUNTER
Central Prior Authorization Team   Phone: [phone]      PA Initiation    Medication: budesonide (PULMICORT) 0.5 MG/2ML neb solution  Insurance Company: [employer] - Phone [phone] Fax [phone]  Pharmacy Filling the Rx: [id number] [place] [facility] - [address]  Filling Pharmacy Phone: [phone]  Filling Pharmacy Fax:    Start Date: 4/1/2021

## 2021-04-01 NOTE — TELEPHONE ENCOUNTER
Patient contacted, informed that the diagnosis code is correct and cannot be changed.  Will submit the information to the prior authorization team.     Brenna Cherry RN

## 2021-04-01 NOTE — TELEPHONE ENCOUNTER
Prior Authorization Retail Medication Request    Medication/Dose: budesonide 0.5 mg/2 mL   ICD code (if different than what is on RX):    Previously Tried and Failed:    Rationale:      Insurance Name:    Insurance ID:        Pharmacy Information (if different than what is on RX)  Name:    Phone:

## 2021-04-05 ENCOUNTER — TELEPHONE (OUTPATIENT)
Dept: GASTROENTEROLOGY | Facility: CLINIC | Age: 56
End: 2021-04-05

## 2021-04-05 NOTE — TELEPHONE ENCOUNTER
[facility] Call Center    Phone Message    May a detailed message be left on voicemail: yes     Reason for Call: Patient stating that she needs her order for Fibrosis Scan sent to the [facility] @ [phone]    Patient would like a [employer] message with confirmation that this has been sent, so she can schedule. Thank you!    Action Taken: Message routed to:  Adult Clinics: Gastroenterology (GI) p [id number]    Travel Screening: Not Applicable

## 2021-04-05 NOTE — TELEPHONE ENCOUNTER
Medication Appeal Initiation    We have initiated an appeal for the requested medication:  Medication: budesonide (PULMICORT) 0.5 MG/2ML neb solution-APPEAL Pending  Appeal Start Date:  4/5/2021  Insurance Company: [employer] - Phone [phone] Fax [phone]  Comments:  Appeal faxed

## 2021-04-05 NOTE — TELEPHONE ENCOUNTER
Order faxed as requested, My Chart message to patient advising the order has been faxed.  Also provided the scheduling number at the [facility] if unable to complete the scan at her desired location.     Brenna Cherry RN

## 2021-04-06 NOTE — TELEPHONE ENCOUNTER
MEDICATION APPEAL DENIED    Medication: budesonide (PULMICORT) 0.5 MG/2ML neb solution-APPEAL DENIED    Denial Date: 4/6/2021    Denial Rational:           Second Level Appeal Information: Second level appeals will be managed by the clinic staff and provider.

## 2021-04-07 ENCOUNTER — TELEPHONE (OUTPATIENT)
Dept: GASTROENTEROLOGY | Facility: CLINIC | Age: 56
End: 2021-04-07

## 2021-04-07 NOTE — TELEPHONE ENCOUNTER
Sent in basket message to ordering providers nurse letting her know fibrosis scans are not being done at this time. If fibrosis scan is needed  provider should order an ultrasound elastograph.    Grace ESCOBAR LPN  Hepatology Clinic

## 2021-04-07 NOTE — TELEPHONE ENCOUNTER
[facility] Call Center    Phone Message    May a detailed message be left on voicemail: yes     Reason for Call: Other: Pt wanted to schedule a Fibrosis scan, but protocols says we can't. Please contact pt to possibly schedule. Thanks!     Action Taken: Message routed to:  [facility] hep    Travel Screening: Not Applicable

## 2021-04-07 NOTE — TELEPHONE ENCOUNTER
Per Gena Celestin PA-C, ok to try through the [facility] to see if compounded viscous budesonide would be approved by insurance.    Brenna Cherry RN

## 2021-04-08 ENCOUNTER — TELEPHONE (OUTPATIENT)
Dept: GASTROENTEROLOGY | Facility: CLINIC | Age: 56
End: 2021-04-08

## 2021-04-08 DIAGNOSIS — K76.0 FATTY LIVER: Primary | ICD-10-CM

## 2021-04-08 DIAGNOSIS — E66.9 OBESITY, UNSPECIFIED CLASSIFICATION, UNSPECIFIED OBESITY TYPE, UNSPECIFIED WHETHER SERIOUS COMORBIDITY PRESENT: ICD-10-CM

## 2021-04-08 NOTE — TELEPHONE ENCOUNTER
Will proceed with a US elastography of the liver instead. This will still allow us to assess the liver for scar tissue/fibrosis. I believe she can get this done at any imaging center that offers sono.    Please give patient scheduling information.     Gena Celestin PA-C  Gastroenterology  [facility]

## 2021-04-08 NOTE — TELEPHONE ENCOUNTER
[facility] Call Center    Phone Message    May a detailed message be left on voicemail: yes     Reason for Call: Patient calling because she went to schedule her Fibrosis scanning and they are not preforming these currently due to covid. They said that you could order an elastography. Kaylah would like a call back, she states she's really confused on what exactly they're recommending instead. Thank you!      Action Taken: Message routed to:  Adult Clinics: Gastroenterology (GI) p [id number]    Travel Screening: Not Applicable

## 2021-04-08 NOTE — TELEPHONE ENCOUNTER
Patient contacted,informed of US elastrography order.  Patient requests that the order be faxed to [facility] @ [phone].    Brenna Cherry RN

## 2021-04-08 NOTE — TELEPHONE ENCOUNTER
See telephone encounter dated 4/8/2021 for documentation regarding imaging order and 4/1/2021 telephone encounter regarding medication.       Brenna Cherry RN

## 2021-04-08 NOTE — TELEPHONE ENCOUNTER
Patient contacted, informed that PA was denied and new Rx has been sent to [facility] to see if Compounded viscous budesonide would be covered by insurance.  Number provided to patient for [facility].      Brenna Cherry RN

## 2021-04-09 NOTE — TELEPHONE ENCOUNTER
[facility] imaging called to state they do not do the US elastography exam at their location.  Patient contacted, informed of this.  Contacted imaging scheduling, this is done only at the [facility] or [place] location.  Provided warm transfer to imaging scheduler to inform patient/schedule.    Brenna Cherry RN

## 2021-04-09 NOTE — TELEPHONE ENCOUNTER
Per [employer], fax was received successfully yesterday.  Resent to number provided, status OK.  Patient informed it was resent.    Brenna Cherry RN

## 2021-04-09 NOTE — TELEPHONE ENCOUNTER
Pt called and said that [facility] in [place] didn't get this fax.  She confirmed the fax # is the same as listed below.  She's requesting that we refax this today.  Thanks.

## 2021-04-16 DIAGNOSIS — K21.00 GASTROESOPHAGEAL REFLUX DISEASE WITH ESOPHAGITIS WITHOUT HEMORRHAGE: ICD-10-CM

## 2021-04-16 RX ORDER — PANTOPRAZOLE SODIUM 40 MG/1
40 TABLET, DELAYED RELEASE ORAL DAILY
Qty: 90 TABLET | Refills: 0 | Status: SHIPPED | OUTPATIENT
Start: 2021-04-16 | End: 2021-11-29

## 2021-04-16 NOTE — TELEPHONE ENCOUNTER
Routed to ordering provider to review and advise.  Pharmacy is requesting pantoprazole twice daily dosing, and order last sent for once daily dosing. Please review and clarify sig.    pantoprazole (PROTONIX) 40 MG EC tablet 180 tablet 0 3/30/2021  No   Sig - Route: Take 1 tablet (40 mg) by mouth daily On an empty stomach about 30 minutes before a meal     CLAUDIO OrtaN, RN

## 2021-04-16 NOTE — TELEPHONE ENCOUNTER
Reduced to once daily at last visit and start on oral viscous budesonide. Can we confirm if she was able to get the oral viscous budesonide, there were initially some issues with coverage.     Gena Celestin PA-C  Gastroenterology  [facility]

## 2021-04-19 NOTE — TELEPHONE ENCOUNTER
Contacted the pharmacy, informed that Viscous Budesonide was filled on 4/13/2021.     Brenna Cherry RN

## 2021-05-17 ENCOUNTER — ANCILLARY PROCEDURE (OUTPATIENT)
Dept: ULTRASOUND IMAGING | Facility: CLINIC | Age: 56
End: 2021-05-17
Attending: PHYSICIAN ASSISTANT
Payer: MEDICARE

## 2021-05-17 DIAGNOSIS — K76.0 FATTY LIVER: ICD-10-CM

## 2021-05-17 DIAGNOSIS — E66.9 OBESITY, UNSPECIFIED CLASSIFICATION, UNSPECIFIED OBESITY TYPE, UNSPECIFIED WHETHER SERIOUS COMORBIDITY PRESENT: ICD-10-CM

## 2021-05-17 PROCEDURE — 91200 LIVER ELASTOGRAPHY: CPT | Performed by: RADIOLOGY

## 2021-05-18 ENCOUNTER — TELEPHONE (OUTPATIENT)
Dept: GASTROENTEROLOGY | Facility: CLINIC | Age: 56
End: 2021-05-18

## 2021-05-18 NOTE — TELEPHONE ENCOUNTER
[facility] Call Center    Phone Message    May a detailed message be left on voicemail: yes     Reason for Call: Patient got some results back from a liver scan that she would like to go over with Gena Celestin PA-C before her appointment next week. Please advise. Thank you.    Action Taken: Message routed to:  Adult Clinics: Gastroenterology (GI) p [id number]    Travel Screening: Not Applicable

## 2021-05-18 NOTE — TELEPHONE ENCOUNTER
LPN left message for patient requesting a return call if patient continues to have questions regarding results. Patient did view mychart result message from Gena Celestin PA-C today.    Susan Vergara LPN

## 2021-05-24 ENCOUNTER — MYC MEDICAL ADVICE (OUTPATIENT)
Dept: GASTROENTEROLOGY | Facility: CLINIC | Age: 56
End: 2021-05-24

## 2021-05-24 ENCOUNTER — VIRTUAL VISIT (OUTPATIENT)
Dept: GASTROENTEROLOGY | Facility: CLINIC | Age: 56
End: 2021-05-24
Payer: MEDICARE

## 2021-05-24 DIAGNOSIS — K22.70 BARRETT'S ESOPHAGUS DETERMINED BY BIOPSY: ICD-10-CM

## 2021-05-24 DIAGNOSIS — R63.4 WEIGHT LOSS: ICD-10-CM

## 2021-05-24 DIAGNOSIS — R10.10 UPPER ABDOMINAL PAIN: Primary | ICD-10-CM

## 2021-05-24 DIAGNOSIS — K20.0 EOSINOPHILIC ESOPHAGITIS: ICD-10-CM

## 2021-05-24 DIAGNOSIS — K76.0 FATTY LIVER: ICD-10-CM

## 2021-05-24 DIAGNOSIS — E66.9 OBESITY, UNSPECIFIED CLASSIFICATION, UNSPECIFIED OBESITY TYPE, UNSPECIFIED WHETHER SERIOUS COMORBIDITY PRESENT: ICD-10-CM

## 2021-05-24 DIAGNOSIS — K21.00 GASTROESOPHAGEAL REFLUX DISEASE WITH ESOPHAGITIS WITHOUT HEMORRHAGE: ICD-10-CM

## 2021-05-24 PROCEDURE — 99443 PR PHYSICIAN TELEPHONE EVALUATION 21-30 MIN: CPT | Mod: 95 | Performed by: PHYSICIAN ASSISTANT

## 2021-05-24 NOTE — PATIENT INSTRUCTIONS
It was a pleasure visiting with you today.     Please call [phone] to schedule a 4 hr gastric emptying scan and a HIDA scan.     Gena Celestin PA-C  Gastroenterology  [facility]

## 2021-05-24 NOTE — PROGRESS NOTES
Kaylah is a 55 year old who is being evaluated via a billable telephone visit.      What phone number would you like to be contacted at? [phone]  How would you like to obtain your AVS? MyChart   Virtually Roomed by: Susan Vergara LPN on 5/24/21 at 3:39 PM    GASTROENTEROLOGY FOLLOW UP TELEPHONE VISIT    CC/REFERRING MD:    Zhang Roberto    REASON FOR VISIT:  RECHECK (Follow up)    HISTORY OF PRESENT ILLNESS:    Kaylah Arreola is 55 year old female who presents for follow up.    She was found to have reflux esophagitis with cheung's esophagus and increased eosinophils on biopsies on recent upper endoscopy. She was not improving on protonix once daily or twice daily and she was therefore started on oral viscous budesonide. She is currently on protonix once daily and oral viscous budesonide twice daily for the past several weeks. She is starting to see an improvement in her symptoms now but she also attributes this improvement to the decreasing of sertraline and buspar which was done by her psychiatrist. She is still having some abdominal pain with certain foods. Especially more dense foods. Pain occurs 5-10 mins after eating. Pain can last anywhere from 10-15 minutes to several hours. She may experience nausea and vomiting. She notes that applying heat helps. She continues to have weight loss. Her current weight is down to 170 lbs. She is due for colonoscopy and is scheduled at outside facility for early July 2021. She however tells me that she may not complete the procedure as she doesn't feel she will be able to tolerate the prep.     She also has fatty liver disease as noted on abd sono. She also had an elastography which was suggestive of advanced liver disease.     She is currently taking protonix and oral viscous budesonide daily for reflux esophagitis and EoE. She feels that her symptoms are improving since the       PMHx, PSHx, Social Hx, Family Hx have been reviewed.         Physical Exam   alert and  no distress  PSYCH: Alert and oriented times 3; coherent speech, normal   rate and volume, able to articulate logical thoughts, able   to abstract reason, no tangential thoughts, no hallucinations   or delusions, her affect is normal  RESP: No cough, no audible wheezing, able to talk in full sentences  Remainder of exam unable to be completed due to telephone visits        ASSESSMENT/PLAN:    1. Upper abdominal pain  2. Eosinophilic esophagitis  3. Gastroesophageal reflux disease with esophagitis without hemorrhage  4. Constantino's esophagus determined by biopsy  5. Fatty liver  6. Obesity, unspecified classification, unspecified obesity type, unspecified whether serious comorbidity present  7. Weight loss        Kaylah Arreola is a 55 year old female who presents for follow up. She is being treated for EoE and reflux esophagitis with oral viscous budesonide and protonix daily. She has been on the oral viscous budesonide for about 6 weeks now and is starting to see some improvement. She continues to have an upper abdominal pain that occurs after eating of certain foods, usually more dense foods. She also continues to experience weight loss. Her work up thus far includes upper endoscopy and abd sono. She is due for screening colonoscopy at this time as well and has this scheduled. She however is unsure if she will complete the procedure, as she doesn't know if she will be able to tolerate the prep. I encouraged her to continue with the colonoscopy. She should have a repeat upper endoscopy as well to reassess her EoE and advised that this could be done at the same time at our facility. She would like to think about this and discuss at a follow up appt in a few weeks.     Also briefly discussed further imaging of her upper abd pain with 4hr GES and HIDA scan. Orders placed.     Return in about 3 weeks (around 6/14/2021) for Follow up, using a phone visit.      Thank you for this consultation.  It was a pleasure to  participate in the care of this patient; please contact us with any further questions.    This note was created with voice recognition software, and while reviewed for accuracy, typos may remain.       Gena Celestin PA-C  Gastroenterology  [facility]     Phone call duration: 36 minutes

## 2021-05-25 NOTE — TELEPHONE ENCOUNTER
[facility] ([place], [place]     Patient is not interested in 4 hr GES at this time, can leave these orders for now but do not fax [facility] to [facility].    Thank you,     Gena Celestin PA-C  Gastroenterology  [facility]

## 2021-05-26 NOTE — TELEPHONE ENCOUNTER
[facility] Call Center    Phone Message    May a detailed message be left on voicemail: yes     Reason for Call: The patient is requesting a call to confirm that the order for a HIDA Scan has been faxed to [facility].  Please advise.  Thank you.     Action Taken: Message routed to:  Adult Clinics: Gastroenterology (GI) p [id number]    Travel Screening: Not Applicable

## 2021-05-27 NOTE — TELEPHONE ENCOUNTER
VINCE spoke with Delmi at [facility] and was informed that they do not do HIDA scans at [facility].  VINCE spoke with Luis in Nuc. Med at [facility] and he confirmed that they do HIDA scans there.  LPN left detailed message for patient notifying her of this and requested a call back. If patient wishes to have orders sent to [facility], order should be faxed to [phone] and patient should call [phone] to schedule.    Susan Vergara LPN

## 2021-05-28 NOTE — TELEPHONE ENCOUNTER
LPN left a 2nd message for patient requesting a return call to discuss the HIDA scan order.     Susan Vergara LPN

## 2021-06-02 ENCOUNTER — TELEPHONE (OUTPATIENT)
Dept: GASTROENTEROLOGY | Facility: CLINIC | Age: 56
End: 2021-06-02

## 2021-06-02 NOTE — TELEPHONE ENCOUNTER
[facility] Call Center    Phone Message    May a detailed message be left on voicemail: yes     Reason for Call: : Patient request a call back to discuss phobia of [place] Hepatobiliary Scan provider ordered .Please call back to advise.     Action Taken: Message routed to:  Adult Clinics: Gastroenterology (GI) p [id number]    Travel Screening: Not Applicable

## 2021-06-02 NOTE — TELEPHONE ENCOUNTER
LPN faxed HIDA scan order to [facility] per patients request. Patient provided with scheduling number via [employer] message.     Susan Vergara LPN

## 2021-06-02 NOTE — TELEPHONE ENCOUNTER
Patient has not returned call to clinic. LPN sent [employer] message to patient regarding the HIDA scan.    Susan Vergara LPN

## 2021-06-03 ENCOUNTER — MYC MEDICAL ADVICE (OUTPATIENT)
Dept: GASTROENTEROLOGY | Facility: CLINIC | Age: 56
End: 2021-06-03

## 2021-06-03 ENCOUNTER — VIRTUAL VISIT (OUTPATIENT)
Dept: GASTROENTEROLOGY | Facility: CLINIC | Age: 56
End: 2021-06-03
Payer: MEDICARE

## 2021-06-03 DIAGNOSIS — R19.5 CHANGE IN STOOL: Primary | ICD-10-CM

## 2021-06-03 DIAGNOSIS — E66.01 CLASS 3 SEVERE OBESITY WITH BODY MASS INDEX (BMI) OF 40.0 TO 44.9 IN ADULT, UNSPECIFIED OBESITY TYPE, UNSPECIFIED WHETHER SERIOUS COMORBIDITY PRESENT (H): ICD-10-CM

## 2021-06-03 DIAGNOSIS — R19.4 CHANGE IN BOWEL HABIT: ICD-10-CM

## 2021-06-03 DIAGNOSIS — R19.8 SYMPTOMS INVOLVING DIGESTIVE SYSTEM: ICD-10-CM

## 2021-06-03 DIAGNOSIS — R10.84 ABDOMINAL PAIN, GENERALIZED: ICD-10-CM

## 2021-06-03 DIAGNOSIS — E66.813 CLASS 3 SEVERE OBESITY WITH BODY MASS INDEX (BMI) OF 40.0 TO 44.9 IN ADULT, UNSPECIFIED OBESITY TYPE, UNSPECIFIED WHETHER SERIOUS COMORBIDITY PRESENT (H): ICD-10-CM

## 2021-06-03 PROCEDURE — 99215 OFFICE O/P EST HI 40 MIN: CPT | Mod: 95 | Performed by: PHYSICIAN ASSISTANT

## 2021-06-03 NOTE — PROGRESS NOTES
Kaylah is a 55 year old who is being evaluated via a billable video visit.      How would you like to obtain your AVS? MyChart  If the video visit is dropped, the invitation should be resent by: Text to cell phone: [phone]  Will anyone else be joining your video visit? No   Virtually Roomed by: Susan Vergara LPN on 6/3/21 at 8:40 AM            GASTROENTEROLOGY FOLLOW UP VIDEO VISIT     Video Start Time: 11:02 AM    CC/REFERRING MD:    Zhang Roberto      REASON FOR CONSULTATION:  RECHECK (Discuss HIDA scan)      HISTORY OF PRESENT ILLNESS:    Kaylah Arreola is 55 year old female who presents for follow up. We last visited about 2 weeks ago. See previous notes for more detail.     She has hx of reflux esophagitis and barretts esophagus with increased eos on most recent EGD. Currently she is on protonix once daily and oral viscous budesonide and finds that this is helping her reflux type of symptoms. She unfortunately continues to have upper abdominal pain. Her work up entailed an abdominal sonogram which noted a fatty liver but was otherwise unremarkable. At our last visit we discussed further evaluation with PHUONG and 4 hr GES however she is not able to complete nuclear medicine studies due to side effects.      She is concerned regarding her abdominal pain and finds that symptoms are progressively worsening. She feels that her stomach is swelling and reports change in her stools noting stools are green now.  She does not have diarrhea.     She does have some stress recently as her mother has declined and is now in a nursing home.     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    She  has a past surgical history that includes Hysterectomy; Electrophysiology Pacemaker; Esophagoscopy, gastroscopy, duodenoscopy (EGD), combined (01/07/2021); and Esophagoscopy, gastroscopy, duodenoscopy (EGD),  combined (N/A, 1/7/2021).    She  reports that she has quit smoking. She has never used smokeless tobacco. She reports previous alcohol use. She reports current drug use. Drug: Marijuana.    ALLERGIES:  Codeine, Hydrocodone-acetaminophen, Metronidazole, Midodrine, Phenytoin, Ranitidine, Clarithromycin, Metoclopramide, and Sulfa drugs    PERTINENT MEDICATIONS:    Current Outpatient Medications:      atorvastatin (LIPITOR) 20 MG tablet, Take 20 mg by mouth daily , Disp: , Rfl:      budesonide (PULMICORT) 0.5 MG/2ML neb solution, Mix 2 ampules with 10 packets of sucrose (splenda) (for each dose) and take this mixture twice daily for eosinophilic esophagitis. Do not eat or drink for 30 minutes after taking, Disp: 240 mL, Rfl: 1     buPROPion (WELLBUTRIN SR) 200 MG 12 hr tablet, Take 1 tablet by mouth daily , Disp: , Rfl:      busPIRone (BUSPAR) 10 MG tablet, Take 5 mg by mouth daily , Disp: , Rfl:      COMPOUNDED NON-CONTROLLED SUBSTANCE (CMPD RX) - PHARMACY TO MIX COMPOUNDED MEDICATION, Viscous budesonide, swallow 1 mg twice daily.  Do not eat or drink for 30 minutes after taking., Disp: 60 mg, Rfl: 1     hydroxychloroquine (PLAQUENIL) 200 MG tablet, TAKE 1 TABLET BY MOUTH TWICE A DAY, Disp: , Rfl: 1     levothyroxine (SYNTHROID/LEVOTHROID) 112 MCG tablet, Take 112 mcg by mouth daily , Disp: , Rfl:      levothyroxine (SYNTHROID/LEVOTHROID) 125 MCG tablet, Take 125 mcg by mouth daily, Disp: , Rfl:      liothyronine (CYTOMEL) 5 MCG tablet, Take 5 mcg by mouth daily, Disp: , Rfl:      loratadine (CLARITIN) 10 MG tablet, Take 10 mg by mouth daily, Disp: , Rfl:      LORazepam (ATIVAN) 1 MG tablet, Take 1 mg by mouth Take 1 tablet during the day and Take 3 tabs at 8pm, Disp: , Rfl:      metoprolol succinate ER (TOPROL-XL) 25 MG 24 hr tablet, Take 25 mg by mouth 2 times daily , Disp: , Rfl: 3     nitroGLYcerin (NITROSTAT) 0.4 MG sublingual tablet, Place 0.4 mg under the tongue every 5 minutes as needed , Disp: , Rfl:       ondansetron (ZOFRAN) 4 MG tablet, TAKE 1 TABLET BY MOUTH EVERY 8 HOURS AS NEEDED FOR NAUSEA, Disp: 30 tablet, Rfl: 0     ondansetron (ZOFRAN-ODT) 4 MG ODT tab, Take 1 tablet by mouth 2 times daily as needed, Disp: , Rfl:      pantoprazole (PROTONIX) 40 MG EC tablet, Take 1 tablet (40 mg) by mouth daily, Disp: 90 tablet, Rfl: 0     sertraline (ZOLOFT) 100 MG tablet, Take 100 mg by mouth daily , Disp: , Rfl:      sertraline (ZOLOFT) 50 MG tablet, TAKE ONE TAB BY MOUTH ONCE DAILY. TAKE ALONG WITH 200MG FOR A TOTAL DAILY DOSE OF 250MG, Disp: , Rfl: 3     tiZANidine (ZANAFLEX) 4 MG tablet, Take 4 mg by mouth 2 times daily , Disp: , Rfl:      topiramate (TOPAMAX) 100 MG tablet, Take 100 mg by mouth 2 times daily , Disp: , Rfl: 2     traMADol (ULTRAM) 50 MG tablet, TAKE 2 TABLETS BY MOUTH TWICE A DAY, Disp: , Rfl: 2     vitamin D3 (CHOLECALCIFEROL) 2000 units tablet, Take 1 tablet by mouth daily , Disp: , Rfl:        ROS: 10 point ROS neg other than the symptoms noted above in the HPI.        PHYSICAL EXAMINATION:  Constitutional: aaox3, cooperative, pleasant, not dyspneic/diaphoretic, no acute distress      GENERAL: morbidly obese  EYES: Eyes grossly normal to inspection.  No discharge or erythema, or obvious scleral/conjunctival abnormalities.  RESP: No audible wheeze, cough, or visible cyanosis.  No visible retractions or increased work of breathing.    SKIN: Visible skin clear. No significant rash, abnormal pigmentation or lesions.  NEURO: Cranial nerves grossly intact.  Mentation and speech appropriate for age.  PSYCH: Mentation appears normal, affect normal/bright, judgement and insight intact, normal speech and appearance well-groomed.        ASSESSMENT/PLAN:    1. Change in stool  2. Abdominal pain, generalized  3. Class 3 severe obesity with body mass index (BMI) of 40.0 to 44.9 in adult, unspecified obesity type, unspecified whether serious comorbidity present (H)  4. Change in bowel habit     Kaylah Arreola is a  55 year old female who presents for follow up. She also has barretts esophagus and possible EoE which is currently well controlled with PPI and oral viscous budesonide. She continues to have an upper abdominal. Work up thus far includes an abd sono and elastography and while she does have a fatty liver and signs of chronic liver disease, it is unclear if this is the cause to her symptoms. She is now noting abdominal bloating/distention and change in stool color. Recommended rechecking labs, stool studies and a CT abd pelvis at this time.     Further recommendations after work up.     Thank you for this consultation.  It was a pleasure to participate in the care of this patient; please contact us with any further questions.      This note was created with voice recognition software, and while reviewed for accuracy, typos may remain.     40 minutes spent on the date of the encounter doing chart review, history and exam, documentation and further activities per the note    Gena Celestin PA-C  Gastroenterology  [facility]       Video-Visit Details    Type of service:  Video Visit    Video End Time:11:13 AM    Originating Location (pt. Location): Home    Distant Location (provider location):  [facility]     Platform used for Video Visit: [employer]

## 2021-06-03 NOTE — Clinical Note
Please fax orders (labs, stool studies and CT) to [facility] in [place]. Thank you - Gena

## 2021-06-03 NOTE — PATIENT INSTRUCTIONS
It was a pleasure visiting with you today.     Blood work, stool studies and a CT scan has been ordered and will be faxed to [facility] in [place] as you requested. I will send you a [employer] message once I have reviewed your results.     Thank you,     Gena Celestin PA-C  Gastroenterology  [facility]

## 2021-06-03 NOTE — TELEPHONE ENCOUNTER
Patient has a video visit with Gena Celestin PA-C today at 11 am. Patient and provider will be discussing HIDA scan at visit. Closing encounter.     Susan Vergara LPN

## 2021-06-03 NOTE — TELEPHONE ENCOUNTER
LPN spoke with patient and patient was in agreement to having a video visit today with Gena Celestin PA-C. Patient is scheduled to discuss HIDA scan.    Gena Celestin PA-C  [facility] Gastroenterology-[facility] 19 minutes ago (8:23 AM)     I have an opening at 11am today. I placed a hold. Can we call her to see if she is available at that time. Thank you!       Susan Vergara LPN

## 2021-06-04 ENCOUNTER — TELEPHONE (OUTPATIENT)
Dept: GASTROENTEROLOGY | Facility: CLINIC | Age: 56
End: 2021-06-04

## 2021-06-04 NOTE — TELEPHONE ENCOUNTER
Writer faxed lab orders (10) to [facility] at Gena Celestin's request.  Fax number given to writer to send faxes: [phone]  Miki Gordon CMA

## 2021-06-04 NOTE — TELEPHONE ENCOUNTER
Writer faxed cover sheet and  CT referral /order to [facility] per Gena Celestin.   Fax Number given to writer by call center is [phone].  Miki Gordon CMA

## 2021-06-16 ENCOUNTER — TELEPHONE (OUTPATIENT)
Dept: GASTROENTEROLOGY | Facility: CLINIC | Age: 56
End: 2021-06-16

## 2021-06-16 NOTE — TELEPHONE ENCOUNTER
Health Call Center    Phone Message    May a detailed message be left on voicemail: yes     Reason for Call: Patient called wanting to get the Lab and CT orders faxed to the [facility]. Fax for the CT- [phone]. Fax for the [place]- [phone]. Please advise. Thank you.    Action Taken: Message routed to:  Adult Clinics: Gastroenterology (GI) p [id number]    Travel Screening: Not Applicable

## 2021-06-16 NOTE — TELEPHONE ENCOUNTER
LPN faxed CT and lab orders to [facility] per patients request. LPN spoke with patient and notified her that orders have been sent.     Susan Vergara LPN

## 2021-06-23 DIAGNOSIS — R11.0 NAUSEA: ICD-10-CM

## 2021-06-23 NOTE — TELEPHONE ENCOUNTER
[facility] Call Center    Phone Message    May a detailed message be left on voicemail: yes     Reason for Call: Medication Refill Request    Has the patient contacted the pharmacy for the refill? Yes   Name of medication being requested: ondansetron (ZOFRAN) 4 MG tablet  Provider who prescribed the medication: Fawad  Pharmacy: [facility] in [facility]  Date medication is needed: ASAP, Pt is completely out    Action Taken: Message routed to:  Adult Clinics: Gastroenterology (GI) p [phone]    Travel Screening: Not Applicable

## 2021-06-24 RX ORDER — ONDANSETRON 4 MG/1
4 TABLET, ORALLY DISINTEGRATING ORAL 2 TIMES DAILY PRN
Status: CANCELLED | OUTPATIENT
Start: 2021-06-24

## 2021-06-24 RX ORDER — ONDANSETRON 4 MG/1
4 TABLET, FILM COATED ORAL EVERY 8 HOURS PRN
Qty: 30 TABLET | Refills: 0 | Status: SHIPPED | OUTPATIENT
Start: 2021-06-24 | End: 2021-08-06

## 2021-06-24 NOTE — TELEPHONE ENCOUNTER
"Requested Prescriptions   Pending Prescriptions Disp Refills     ondansetron (ZOFRAN-ODT) 4 MG ODT tab       Sig: Take 1 tablet (4 mg) by mouth 2 times daily as needed        Antivertigo/Antiemetic Agents Passed - 6/24/2021  8:52 AM        Passed - Recent (12 mo) or future (30 days) visit within the authorizing provider's specialty     Patient has had an office visit with the authorizing provider or a provider within the authorizing providers department within the previous 12 mos or has a future within next 30 days. See \"Patient Info\" tab in inbasket, or \"Choose Columns\" in Meds & Orders section of the refill encounter.              Passed - Medication is active on med list        Passed - Patient is 18 years of age or older           Refill authorized per [facility] protocol.    Brenna Cherry RN    "

## 2021-07-14 ENCOUNTER — TELEPHONE (OUTPATIENT)
Dept: GASTROENTEROLOGY | Facility: CLINIC | Age: 56
End: 2021-07-14

## 2021-07-14 NOTE — TELEPHONE ENCOUNTER
Writer received paperwork that needed a correct Dx code.   Paperwork from lab sent to [facility] for review.  Writer received corrected paperwork on 7/14/2021 and faxed lab papers to [facility] ([phone]).  Confirmed with Right fax it was sent.  Miki Gordon CMA

## 2021-08-06 DIAGNOSIS — R11.0 NAUSEA: ICD-10-CM

## 2021-08-06 RX ORDER — ONDANSETRON 4 MG/1
4 TABLET, FILM COATED ORAL EVERY 8 HOURS PRN
Qty: 30 TABLET | Refills: 1 | Status: SHIPPED | OUTPATIENT
Start: 2021-08-06 | End: 2021-10-11

## 2021-08-06 NOTE — TELEPHONE ENCOUNTER
Refill authorized per [facility] protocol, message left informing patient.    Brenna Cherry RN

## 2021-08-06 NOTE — TELEPHONE ENCOUNTER
[facility] Call Center    Phone Message    May a detailed message be left on voicemail: yes     Reason for Call: Medication Refill Request    Has the patient contacted the pharmacy for the refill? Yes   Name of medication being requested: ondansetron (ZOFRAN) 4 MG tablet  Provider who prescribed the medication: Fawad  Pharmacy:    [facility] [address] [address]  Date medication is needed: Patient is asking to  tomorrow 8/7/21. Please advise. Thank you.      Action Taken: Message routed to:  Adult Clinics: Gastroenterology (GI) p [id number]    Travel Screening: Not Applicable

## 2021-09-04 ENCOUNTER — HEALTH MAINTENANCE LETTER (OUTPATIENT)
Age: 56
End: 2021-09-04

## 2021-09-27 ENCOUNTER — VIRTUAL VISIT (OUTPATIENT)
Dept: GASTROENTEROLOGY | Facility: CLINIC | Age: 56
End: 2021-09-27
Payer: MEDICARE

## 2021-09-27 DIAGNOSIS — K76.0 FATTY LIVER: ICD-10-CM

## 2021-09-27 DIAGNOSIS — K21.00 GASTROESOPHAGEAL REFLUX DISEASE WITH ESOPHAGITIS WITHOUT HEMORRHAGE: ICD-10-CM

## 2021-09-27 DIAGNOSIS — K20.0 EOSINOPHILIC ESOPHAGITIS: ICD-10-CM

## 2021-09-27 DIAGNOSIS — K76.9 CHRONIC LIVER DISEASE: ICD-10-CM

## 2021-09-27 DIAGNOSIS — R10.10 UPPER ABDOMINAL PAIN: Primary | ICD-10-CM

## 2021-09-27 PROCEDURE — 99215 OFFICE O/P EST HI 40 MIN: CPT | Mod: 95 | Performed by: PHYSICIAN ASSISTANT

## 2021-09-27 NOTE — PROGRESS NOTES
GASTROENTEROLOGY FOLLOW UP VIDEO VISIT     Video Start Time: 9:30 AM    CC/REFERRING MD:    Zhang Roberto    REASON FOR VISIT: Follow up      HISTORY OF PRESENT ILLNESS:    Kaylah Arreola is 56 year old female who presents for follow up.     She has hx of reflux esophagitis and barretts esophagus with increased eos on prior EGD from January 2021. Currently she is on protonix once daily and oral viscous budesonide and finds that this controls her reflux. She unfortunately continues to have upper abdominal pain. Her work up entailed an abdominal sonogram which noted a fatty liver but was otherwise unremarkable. Subsequent US elastography was suggestive of advanced chronic liver disease. At one of prior visits we discussed further evaluation with PHUONG and 4 hr GES however she is not able to complete nuclear medicine studies due to side effects.       She continues to have an upper abdominal pain. She develops cramping and pain after eating. At times she develops nausea and vomiting after eating. She has abdominal bloating and feels full quickly.     She was previously concerned with weight loss however she reports her weight as being stable at this time. She was previously recommended to ha ve a colonosocpy given her prior weight loss and hx of colon polyps she however declined and continues to decline today. A recent CT abd/pelvis in June 2021 was unrevealing.     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    She  has a past surgical history that includes Hysterectomy; Electrophysiology Pacemaker; Esophagoscopy, gastroscopy, duodenoscopy (EGD), combined (01/07/2021); and Esophagoscopy, gastroscopy, duodenoscopy (EGD), combined (N/A, 1/7/2021).    She  reports that she has quit smoking. She has never used smokeless tobacco. She reports previous alcohol use. She reports current drug use.  Drug: Marijuana.    Her family history is not on file.    ALLERGIES:  Codeine, Hydrocodone-acetaminophen, Metronidazole, Midodrine, Phenytoin, Ranitidine, Clarithromycin, Metoclopramide, and Sulfa drugs      PREVIOUS ENDOSCOPY:    Upper Endoscopy 1/7/2021  Impression:          Mild reflux esophagitis- biopsied. Biopsied to rule out                        H pylori. No endoscopic findings during today's exam to                        account for severe symptoms. Patient became abruptly                        tachycardic () at end of procedure which                        terminated with Valsalva maneuver.                        - LA Grade A reflux esophagitis. Biopsied.                        - Z-line regular, 36 cm from the incisors.                        - Gastroesophageal flap valve classified as Hill Grade                        III (minimal fold, loose to endoscope, hiatal hernia                        likely).                        - Normal stomach. Biopsied.                        - Normal duodenal bulb and second portion of the                        duodenum.                        - The examination was otherwise normal.       SPECIMEN(S):   A: Gastric biopsies   B: Esophageal biopsy     FINAL DIAGNOSIS:   A. STOMACH, BIOPSIES:   - Gastric body and antral type mucosa with mild chronic inflammation   - No H. pylori like organisms identified on routine staining   - Negative for intestinal metaplasia or dysplasia     B. ESOPHAGUS, BIOPSY:   - Metaplastic columnar epithelium with goblet cells, consistent with   Constantino's esophagus; negative for dysplasia   - Squamous epithelium with increased intraepithelial eosinophils (up to 50    per high power field), consistent with eosinophilic esophagitis     I have personally reviewed all specimens and/or slides, including the   listed special stains, and used them   with my medical judgement to determine or confirm the final diagnosis.     Electronically signed  out by:     Rosa Bradford M.D., Physicians         PERTINENT MEDICATIONS:    Current Outpatient Medications:      atorvastatin (LIPITOR) 20 MG tablet, Take 20 mg by mouth daily , Disp: , Rfl:      budesonide (PULMICORT) 0.5 MG/2ML neb solution, Mix 2 ampules with 10 packets of sucrose (splenda) (for each dose) and take this mixture twice daily for eosinophilic esophagitis. Do not eat or drink for 30 minutes after taking, Disp: 240 mL, Rfl: 1     busPIRone (BUSPAR) 10 MG tablet, Take 5 mg by mouth daily , Disp: , Rfl:      COMPOUNDED NON-CONTROLLED SUBSTANCE (CMPD RX) - PHARMACY TO MIX COMPOUNDED MEDICATION, Viscous budesonide, swallow 1 mg twice daily.  Do not eat or drink for 30 minutes after taking., Disp: 60 mg, Rfl: 1     hydroxychloroquine (PLAQUENIL) 200 MG tablet, TAKE 1 TABLET BY MOUTH TWICE A DAY, Disp: , Rfl: 1     levothyroxine (SYNTHROID/LEVOTHROID) 112 MCG tablet, Take 112 mcg by mouth daily , Disp: , Rfl:      levothyroxine (SYNTHROID/LEVOTHROID) 125 MCG tablet, Take 125 mcg by mouth daily, Disp: , Rfl:      liothyronine (CYTOMEL) 5 MCG tablet, Take 5 mcg by mouth daily, Disp: , Rfl:      loratadine (CLARITIN) 10 MG tablet, Take 10 mg by mouth daily, Disp: , Rfl:      LORazepam (ATIVAN) 1 MG tablet, Take 1 mg by mouth Take 1 tablet during the day and Take 3 tabs at 8pm, Disp: , Rfl:      metoprolol succinate ER (TOPROL-XL) 25 MG 24 hr tablet, Take 25 mg by mouth 2 times daily , Disp: , Rfl: 3     nitroGLYcerin (NITROSTAT) 0.4 MG sublingual tablet, Place 0.4 mg under the tongue every 5 minutes as needed , Disp: , Rfl:      ondansetron (ZOFRAN) 4 MG tablet, Take 1 tablet (4 mg) by mouth every 8 hours as needed for nausea, Disp: 30 tablet, Rfl: 1     ondansetron (ZOFRAN-ODT) 4 MG ODT tab, Take 1 tablet by mouth 2 times daily as needed, Disp: , Rfl:      pantoprazole (PROTONIX) 40 MG EC tablet, Take 1 tablet (40 mg) by mouth daily, Disp: 90 tablet, Rfl: 0     sertraline (ZOLOFT) 100 MG tablet, Take 100 mg  by mouth daily , Disp: , Rfl:      sertraline (ZOLOFT) 50 MG tablet, TAKE ONE TAB BY MOUTH ONCE DAILY. TAKE ALONG WITH 200MG FOR A TOTAL DAILY DOSE OF 250MG, Disp: , Rfl: 3     tiZANidine (ZANAFLEX) 4 MG tablet, Take 4 mg by mouth 2 times daily , Disp: , Rfl:      topiramate (TOPAMAX) 100 MG tablet, Take 100 mg by mouth 2 times daily , Disp: , Rfl: 2     traMADol (ULTRAM) 50 MG tablet, TAKE 2 TABLETS BY MOUTH TWICE A DAY, Disp: , Rfl: 2     buPROPion (WELLBUTRIN SR) 200 MG 12 hr tablet, Take 1 tablet by mouth daily  (Patient not taking: Reported on 9/27/2021), Disp: , Rfl:      vitamin D3 (CHOLECALCIFEROL) 2000 units tablet, Take 1 tablet by mouth daily  (Patient not taking: Reported on 9/27/2021), Disp: , Rfl:           PHYSICAL EXAMINATION:  Constitutional: aaox3, cooperative, pleasant, not dyspneic/diaphoretic, no acute distress      GENERAL: Healthy, alert and no distress  EYES: Eyes grossly normal to inspection.  No discharge or erythema, or obvious scleral/conjunctival abnormalities.  RESP: No audible wheeze, cough, or visible cyanosis.  No visible retractions or increased work of breathing.    SKIN: Visible skin clear. No significant rash, abnormal pigmentation or lesions.  NEURO: Cranial nerves grossly intact.  Mentation and speech appropriate for age.  PSYCH: Mentation appears normal, affect normal/bright, judgement and insight intact, normal speech and appearance well-groomed.          PERTINENT STUDIES: (I personally reviewed these laboratory studies today)  Most recent CBC:   Recent Labs   Lab Test 03/05/19  1514   WBC 15.1*   HGB 14.5   HCT 45.1        Most recent hepatic panel:  Recent Labs   Lab Test 03/05/19  1514 12/18/18  0000   ALT 26 28   AST 11 11*     Most recent creatinine:  Recent Labs   Lab Test 03/05/19 1514 12/18/18  0000   CR 0.94 1.09*       TSH   Date Value Ref Range Status   12/18/2018 0.416 0.358 - 3.740 UIU/mL Final         RADIOLOGY:     EXAM: CT ABDOMEN PELVIS W   LOCATION:  [facility]   DATE/TIME: 6/30/2021 9:08 AM     INDICATION: Abdominal distension   COMPARISON: CT of the abdomen and pelvis dated 11/28/2011.   TECHNIQUE: CT scan of the abdomen and pelvis was performed following injection of IV contrast. Multiplanar reformats were obtained. Dose reduction techniques were used.   CONTRAST: Omnipaque 350 93 ml     FINDINGS:   LOWER CHEST: Partially visualized pacemaker lead.     HEPATOBILIARY: No significant mass or bile duct dilatation. No calcified gallstones.     PANCREAS: No significant mass, duct dilatation, or inflammatory change.     SPLEEN: Normal size spleen. Subcentimeter hypoattenuating lesion, likely a small cyst or hemangioma. This does not require follow-up.     ADRENAL GLANDS: No significant nodules.     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 Normal bilateral nephrograms without enhancing mass. No urinary tract dilatation. No bladder wall thickening.     BOWEL: No obstruction or inflammatory change. Appendectomy.     LYMPH NODES: No lymphadenopathy.     VASCULATURE: No abdominal aortic aneurysm. Circumaortic left renal vein.     PELVIC ORGANS: Hysterectomy. No adnexal masses.     MUSCULOSKELETAL: Mild degenerative changes of the visualized thoracolumbar spine and hips.     IMPRESSION:   1.  No CT findings to explain the patient's reported symptoms.     2.  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     Date: 06/30/21   Received From: [facility]              EXAMINATION:Liver Ultrasound Elastography, 5/17/2021 9:17 AM     COMPARISON: Ultrasound abdomen 3/12/2019     HISTORY: Fatty liver; Obesity, unspecified classification, unspecified  obesity type, unspecified whether serious comorbidity present     FINDINGS:   Ultrasound elastography of the liver was performed using a Vizimax  ultrasound machine using 10 separate measurements of the  liver  parenchyma with patient in supine position. Measurements were obtained  approximately 2 cm beneath Jayce's capsule, perpendicular to the  liver capsule. Images are of satisfactory quality.     The median liver stiffness is: 1.88 m/sec  The mean liver stiffness is: 1.89 m/sec  The interquartile range is: 0.179  IQR/median: 0.09.  (IQR/median value of greater than 0.15 indicates  that a dataset is unreliable)                                                                      IMPRESSION:  The median liver stiffness is 1.88 m/sec and the IQR/median value is  0.09 . This is a high elastography value suggestive of advanced  chronic liver disease.        Consensus criteria for interpreting liver stiffness values in patients  with viral hepatitis or NAFLD according to SRU consensus guidelines  (Lopez et. al. Radiology: 2020. epub)     High probability of being normal:  <1.3 m/sec  Rules out advanced chronic liver disease in asymptomatic patients:  <1.7 m/sec  Suggestive of advanced chronic liver disease: 1.7-2.1 m/sec  Indicates advanced chronic liver disease: >2.1 m/sec  Suggestive of clinically significant portal hypertension: >2.4 m/sec     I have personally reviewed the examination and initial interpretation  and I agree with the findings.     ANJELICA ERAZO MD        ASSESSMENT/PLAN:    1. Upper abdominal pain  2. Eosinophilic esophagitis  3. Gastroesophageal reflux disease with esophagitis without hemorrhage  4. Fatty liver  5. Chronic liver disease      Kaylah Arreola is a 56 year old female who presents for follow up.  She has barretts esophagus and possible EoE which is currently well controlled with PPI and oral viscous budesonide. She continues to have an upper abdominal. Work up thus far includes an abd sono and elastography and while she does have a fatty liver and signs of chronic liver disease, it is unclear if this is the cause to her symptoms but she is concerned. Will therefore refer to  hepatology. She also has abdominal bloating. We previously recommended a HIDA scan and 4 hr GES for further evaluation of her symptoms as well as updating a colonoscopy given her previous concerns with weight loss. She has deferred nuclear medicine testing and colonoscopy. We did obtain a CT abd pelvis which was unrevealing.     At this time recommended we repeat her EGD given hx of reflux and EoE and continued abdominal pain. Her symptoms are suggestive/concerning for delayed gastric emptying. Considered trial of pro motility agent however she has tried reglan in the past and had an allergic reaction (rash) so will avoid prescribing. Recommended focus on smaller more frequent meals.     Lastly should consider functional causes for her continued GI symptoms. May benefit from anti-spasmodic as needed and or trial of TCA. (would however defer TCA to psychiatrist).       Thank you for this consultation.  It was a pleasure to participate in the care of this patient; please contact us with any further questions.      This note was created with voice recognition software, and while reviewed for accuracy, typos may remain.       40 minutes spent on the date of the encounter doing chart review, history and exam, documentation and further activities per the note    Gena Celestin PA-C  Gastroenterology  [facility]       Video-Visit Details    Type of service:  Video Visit    Video End Time:9:53 AM    Originating Location (pt. Location): Home    Distant Location (provider location):  [facility]     Platform used for Video Visit: Renee

## 2021-09-27 NOTE — PATIENT INSTRUCTIONS
It was a pleasure visiting with you today.     Please schedule your upper endoscopy. If you have not heard from the scheduling office within 2 business days, please call [phone] to schedule.       Gena Celestin PA-C  Gastroenterology  [facility]

## 2021-09-27 NOTE — PROGRESS NOTES
Kaylah is a 56 year old who is being evaluated via a billable video visit.      How would you like to obtain your AVS? MyChart  If the video visit is dropped, the invitation should be resent by: Text to cell phone: [phone]  Will anyone else be joining your video visit? No      Video Start Time:   Video-Visit Details    Type of service:  Video Visit    Video End Time:    Originating Location (pt. Location):     Distant Location (provider location):  [facility]     Platform used for Video Visit:   Miki Gordon CMA

## 2021-10-06 ENCOUNTER — TELEPHONE (OUTPATIENT)
Dept: GASTROENTEROLOGY | Facility: CLINIC | Age: 56
End: 2021-10-06

## 2021-10-06 DIAGNOSIS — Z11.59 ENCOUNTER FOR SCREENING FOR OTHER VIRAL DISEASES: ICD-10-CM

## 2021-10-06 NOTE — TELEPHONE ENCOUNTER
Screening Questions  1. Are you active on mychart? YES    2. What insurance is in the chart? MEDICARE    2.  Ordering/Referring Provider: DR. GUZMÁN    3. BMI 33.8    4. Do you have any pulmonary issues? Yes: NO    5. Have you had a heart, lung, or liver transplant? NO-LIVER DISEASE     6. Are you currently on dialysis or have chronic kidney disease? NO    7. Have you had a stroke or Transient ischemic atttack (TIA) within 6 months? NO    8. In the past 6 months, have you had any heart related issues including cardiomyopathy or heart attack? YES      If yes, did it require cardiac stenting or other implantable device?NO      9. Do you have any implantable devices in your body (pacemaker, defib, LVAD)? pacemaker    10. Do you take nitroglycerin? If yes, how often? YES    11. Are you currently taking any blood thinners? NO    12. Are you a diabetic? NO    13. (Females) Are you currently pregnant?   If yes, how many weeks?      15. Are you taking any prescription pain medications on a routine schedule? YES  If yes, MAC sedation.    16. Do you have any chemical dependencies such as alcohol, street drugs, or methadone? NO If yes, MAC sedation.    17. Do you have any history of post-traumatic stress syndrome, severe anxiety or history of psychosis? DEPRESSION AND ANXIETY     18. Do you transfer independently? YES    19.  Do you have any issues with constipation? NO    20. Preferred Pharmacy for Pre Prescription [facility]     Scheduling Details    Which Colonoscopy Prep was Sent?:   Procedure Scheduled: EGD  Provider/Surgeon: DR. QUINN   Date of Procedure: 10/28/2021  Location: [facility]  Caller (Please ask for phone number if not scheduled by patient): THEODORE       Sedation Type: MAC   Conscious Sedation- Needs  for 6 hours after the procedure  MAC/General-Needs  for 24 hours after procedure    Pre-op Required at [facility], [place], [place] and OR for MAC sedation:   (if yes advise patient they will need a  pre-op prior to procedure)      Is patient on blood thinners? -NO (If yes- inform patient to follow up with PCP or provider for follow up instructions)     Informed patient they will need an adult  YES  Cannot take any type of public or medical transportation alone    [facility]Pro[facility] or Externally: EXTERNALLY     Confirmed Nurse will call to complete assessment YES    Additional comments: NO

## 2021-10-07 NOTE — TELEPHONE ENCOUNTER
RECORDS RECEIVED FROM: Internal   Appt Date: 10.13.2021   NOTES STATUS DETAILS   OFFICE NOTE from referring provider Internal 09.27.2021 Consult Gena Celestin PA-C   OFFICE NOTES from other specialists N/A    DISCHARGE SUMMARY from hospital N/A    MEDICATION LIST Internal    LIVER BIOSPY (IF APPLICABLE)      PATHOLOGY REPORTS  N/A    IMAGING     ENDOSCOPY (IF AVAILABLE) Internal 01.07.2021   COLONOSCOPY (IF AVAILABLE) N/A    ULTRASOUND LIVER Internal 07.17.2021 Liver Ultrasound Elastography   CT OF ABDOMEN N/A    MRI OF LIVER N/A    FIBROSCAN, US ELASTOGRAPHY, FIBROSIS SCAN, MR ELASTOGRAPHY N/A    LABS     HEPATIC PANEL (LIVER PANEL) Care Everywhere 06.30.2021   BASIC METABOLIC PANEL Care Everywhere 06.30.2021   COMPLETE METABOLIC PANEL Care Everywhere 06.30.2021   COMPLETE BLOOD COUNT (CBC) Care Everywhere 06.30.2021   INTERNATIONAL NORMALIZED RATIO (INR) N/A    HEPATITIS C ANTIBODY N/A    HEPATITIS C VIRAL LOAD/PCR N/A    HEPATITIS C GENOTYPE N/A    HEPATITIS B SURFACE ANTIGEN N/A    HEPATITIS B SURFACE ANTIBODY N/A    HEPATITIS B DNA QUANT LEVEL N/A    HEPATITIS B CORE ANTIBODY N/A

## 2021-10-11 DIAGNOSIS — R11.0 NAUSEA: ICD-10-CM

## 2021-10-11 RX ORDER — ONDANSETRON 4 MG/1
4 TABLET, FILM COATED ORAL EVERY 8 HOURS PRN
Qty: 30 TABLET | Refills: 1 | Status: SHIPPED | OUTPATIENT
Start: 2021-10-11

## 2021-10-11 NOTE — TELEPHONE ENCOUNTER
[facility] Call Center    Phone Message    May a detailed message be left on voicemail: yes     Reason for Call: Medication Refill Request    Has the patient contacted the pharmacy for the refill? Yes   Name of medication being requested: ondansetron (ZOFRAN) 4 MG tablet     Provider who prescribed the medication: Lenka Celestin PA-C  Pharmacy: [facility] [id number] [address]    Date medication is needed: ASAP      Per pt called the pharmacy for a refill but they told pt she is out of refills and would need to renew the prescriptions. Please advise. Thank you.    Action Taken: Message routed to:  [facility] ([facility] gastro    Travel Screening: Not Applicable

## 2021-10-13 ENCOUNTER — PRE VISIT (OUTPATIENT)
Dept: GASTROENTEROLOGY | Facility: CLINIC | Age: 56
End: 2021-10-13

## 2021-10-13 ENCOUNTER — OFFICE VISIT (OUTPATIENT)
Dept: GASTROENTEROLOGY | Facility: CLINIC | Age: 56
End: 2021-10-13
Attending: PHYSICIAN ASSISTANT
Payer: MEDICARE

## 2021-10-13 VITALS
DIASTOLIC BLOOD PRESSURE: 79 MMHG | SYSTOLIC BLOOD PRESSURE: 124 MMHG | WEIGHT: 170.6 LBS | OXYGEN SATURATION: 97 % | HEART RATE: 85 BPM | RESPIRATION RATE: 18 BRPM | HEIGHT: 60 IN | BODY MASS INDEX: 33.49 KG/M2 | TEMPERATURE: 99.2 F

## 2021-10-13 DIAGNOSIS — K76.0 FATTY LIVER: Primary | ICD-10-CM

## 2021-10-13 DIAGNOSIS — E66.813 CLASS 3 SEVERE OBESITY WITH BODY MASS INDEX (BMI) OF 40.0 TO 44.9 IN ADULT, UNSPECIFIED OBESITY TYPE, UNSPECIFIED WHETHER SERIOUS COMORBIDITY PRESENT (H): ICD-10-CM

## 2021-10-13 DIAGNOSIS — K76.9 CHRONIC LIVER DISEASE: ICD-10-CM

## 2021-10-13 DIAGNOSIS — E66.01 CLASS 3 SEVERE OBESITY WITH BODY MASS INDEX (BMI) OF 40.0 TO 44.9 IN ADULT, UNSPECIFIED OBESITY TYPE, UNSPECIFIED WHETHER SERIOUS COMORBIDITY PRESENT (H): ICD-10-CM

## 2021-10-13 PROCEDURE — 99215 OFFICE O/P EST HI 40 MIN: CPT | Performed by: STUDENT IN AN ORGANIZED HEALTH CARE EDUCATION/TRAINING PROGRAM

## 2021-10-13 PROCEDURE — G0463 HOSPITAL OUTPT CLINIC VISIT: HCPCS

## 2021-10-13 RX ORDER — GALCANEZUMAB 120 MG/ML
120 INJECTION, SOLUTION SUBCUTANEOUS
COMMUNITY
Start: 2021-09-15

## 2021-10-13 RX ORDER — ESCITALOPRAM OXALATE 5 MG/1
5 TABLET ORAL DAILY
COMMUNITY

## 2021-10-13 RX ORDER — HYDROXYZINE PAMOATE 25 MG/1
1-2 CAPSULE ORAL
COMMUNITY
Start: 2021-08-11

## 2021-10-13 ASSESSMENT — MIFFLIN-ST. JEOR: SCORE: 1275.34

## 2021-10-13 ASSESSMENT — PAIN SCALES - GENERAL: PAINLEVEL: EXTREME PAIN (8)

## 2021-10-13 NOTE — PROGRESS NOTES
[facility] New Patient Visit    Date of Visit: October 13, 2021    Reason for referral: fatty liver, chronic liver disease    Subjective: Ms. 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    She reports she had jaundice within the first few months of life, and again a few months ago, but otherwise denies a history of liver disease or decompensation.     2019 Liver US normal  3/2021 Liver US showing fatty, enlarged liver    12/2018, 3/2019, 9/2019, 2/2021, 6/2021 LFTs normal    She had US elastography 5/2021 that showed median liver stiffness 1.89 m/sec suggestive of advanced chronic liver disease. She is very worried about her liver, has been reading a lot about it online and feels because of this she has only a few years to live.     Does not drink alcohol, last drink was 20 years ago. She reports weight loss related to her gastrointestinal issues. Followed in [facility] for this. She has regular nausea, vomiting, abdominal pain in her RUQ. She cannot eat and is losing weight. GI has recommended a CT scan and a colonoscopy (she has declined this). She has a follow up EGD scheduled soon. Is on swallowed steroids but does not think they are working anymore. She reports losing 70 lbs, was 214 lbs 5/2019, now 170 lbs. She is tearful and very frustrated with her medical issues.      ROS: 14 point ROS negative except for positives noted in HPI.    PMHx:  Past Medical History:   Diagnosis Date     Anxiety      AV block, 3rd degree (H)      Depression      Fibromyalgia      GERD (gastroesophageal reflux disease)      Syncope, cardiogenic    EoE  Constantino's esophagus  Leopard syndrome    PSHx:  Past Surgical History:   Procedure Laterality Date     EP PACEMAKER       ESOPHAGOSCOPY, GASTROSCOPY, DUODENOSCOPY (EGD), COMBINED   01/07/2021     ESOPHAGOSCOPY, GASTROSCOPY, DUODENOSCOPY (EGD), COMBINED N/A 1/7/2021    Procedure: ESOPHAGOGASTRODUODENOSCOPY (EGD);  Surgeon: Rufino Hoover MD;  Location:  GI     HYSTERECTOMY         FamHx:  No family history of liver disease, liver cancer  Sister with breast cancer x 3  Sister with ovarian cancer    SocHx:  Social History     Socioeconomic History     Marital status:      Spouse name: Not on file     Number of children: Not on file     Years of education: Not on file     Highest education level: Not on file   Occupational History     Not on file   Tobacco Use     Smoking status: Former Smoker     Smokeless tobacco: Never Used   Substance and Sexual Activity     Alcohol use: Not Currently     Drug use: Yes     Types: Marijuana     Comment: medical     Sexual activity: Not on file   Other Topics Concern     Parent/sibling w/ CABG, MI or angioplasty before 65F 55M? Not Asked   Social History Narrative     Not on file     Social Determinants of Health     Financial Resource Strain:      Difficulty of Paying Living Expenses:    Food Insecurity:      Worried About Running Out of Food in the Last Year:      Ran Out of Food in the Last Year:    Transportation Needs:      Lack of Transportation (Medical):      Lack of Transportation (Non-Medical):    Physical Activity:      Days of Exercise per Week:      Minutes of Exercise per Session:    Stress:      Feeling of Stress :    Social Connections:      Frequency of Communication with Friends and Family:      Frequency of Social Gatherings with Friends and Family:      Attends [employer] Services:      Active Member of Clubs or Organizations:      Attends Club or Organization Meetings:      Marital Status:    Intimate Partner Violence:      Fear of Current or Ex-Partner:      Emotionally Abused:      Physically Abused:      Sexually Abused:        Medications:  Current Outpatient Medications   Medication     atorvastatin (LIPITOR) 20 MG tablet      busPIRone (BUSPAR) 10 MG tablet     EMGALITY 120 MG/ML injection     escitalopram (LEXAPRO) 5 MG tablet     hydroxychloroquine (PLAQUENIL) 200 MG tablet     hydrOXYzine (VISTARIL) 25 MG capsule     levothyroxine (SYNTHROID/LEVOTHROID) 112 MCG tablet     levothyroxine (SYNTHROID/LEVOTHROID) 125 MCG tablet     liothyronine (CYTOMEL) 5 MCG tablet     loratadine (CLARITIN) 10 MG tablet     LORazepam (ATIVAN) 1 MG tablet     metoprolol succinate ER (TOPROL-XL) 25 MG 24 hr tablet     nitroGLYcerin (NITROSTAT) 0.4 MG sublingual tablet     ondansetron (ZOFRAN) 4 MG tablet     tiZANidine (ZANAFLEX) 4 MG tablet     topiramate (TOPAMAX) 100 MG tablet     traMADol (ULTRAM) 50 MG tablet     budesonide (PULMICORT) 0.5 MG/2ML neb solution     buPROPion (WELLBUTRIN SR) 200 MG 12 hr tablet     COMPOUNDED NON-CONTROLLED SUBSTANCE (CMPD RX) - PHARMACY TO MIX COMPOUNDED MEDICATION     ondansetron (ZOFRAN-ODT) 4 MG ODT tab     pantoprazole (PROTONIX) 40 MG EC tablet     sertraline (ZOLOFT) 100 MG tablet     sertraline (ZOLOFT) 50 MG tablet     vitamin D3 (CHOLECALCIFEROL) 2000 units tablet     No current facility-administered medications for this visit.     No OTCs, herbals    Allergies:  Allergies   Allergen Reactions     Codeine Nausea     Other reaction(s): GI Upset, Vomiting  headaches       Hydrocodone-Acetaminophen Nausea and Nausea and Vomiting     Other reaction(s): Headache, Vomiting  headache       Metronidazole Unknown     Midodrine      Bloating.     Phenytoin Unknown     Other reaction(s): *Unknown     Ranitidine      sensitivity  Severe depression       Clarithromycin Rash     Other reaction(s): Vomiting     Metoclopramide Rash     depression    Other reaction(s): Emotional Disturbance  depression     Sulfa Drugs Hives and Rash       Objective:  /79 (BP Location: Right arm, Patient Position: Sitting, Cuff Size: Adult Large)   Pulse 85   Temp 99.2  F (37.3  C) (Oral)   Resp 18   Ht 1.524 m (5')   Wt 77.4 kg  (170 lb 9.6 oz)   SpO2 97%   BMI 33.32 kg/m    Constitutional: pleasant woman in NAD  Eyes: non icteric  Respiratory: Normal respiratory excursion   MSK: normal range of motion of visualized extremities  Abd: Non distended  Skin: No jaundice  Psychiatric: normal mood and orientation    Labs:  Last Comprehensive Metabolic Panel:  Sodium   Date Value Ref Range Status   03/05/2019 141 133 - 144 mmol/L Final     Potassium   Date Value Ref Range Status   03/05/2019 4.4 3.4 - 5.3 mmol/L Final     Chloride   Date Value Ref Range Status   03/05/2019 110 (H) 94 - 109 mmol/L Final     Carbon Dioxide   Date Value Ref Range Status   03/05/2019 24 20 - 32 mmol/L Final     Anion Gap   Date Value Ref Range Status   03/05/2019 7 3 - 14 mmol/L Final     Glucose   Date Value Ref Range Status   03/05/2019 123 (H) 70 - 99 mg/dL Final     Comment:     Non Fasting     Urea Nitrogen   Date Value Ref Range Status   03/05/2019 17 7 - 30 mg/dL Final     Creatinine   Date Value Ref Range Status   03/05/2019 0.94 0.52 - 1.04 mg/dL Final     GFR Estimate   Date Value Ref Range Status   03/05/2019 68 >60 mL/min/[1.73_m2] Final     Comment:     Non  GFR Calc  Starting 12/18/2018, serum creatinine based estimated GFR (eGFR) will be   calculated using the Chronic Kidney Disease Epidemiology Collaboration   (CKD-EPI) equation.       Calcium   Date Value Ref Range Status   03/05/2019 9.3 8.5 - 10.1 mg/dL Final     Bilirubin Total   Date Value Ref Range Status   03/05/2019 0.3 0.2 - 1.3 mg/dL Final     Alkaline Phosphatase   Date Value Ref Range Status   03/05/2019 98 40 - 150 U/L Final     ALT   Date Value Ref Range Status   03/05/2019 26 0 - 50 U/L Final     AST   Date Value Ref Range Status   03/05/2019 11 0 - 45 U/L Final       Lab Results   Component Value Date    WBC 15.1 03/05/2019     Lab Results   Component Value Date    RBC 4.92 03/05/2019     Lab Results   Component Value Date    HGB 14.5 03/05/2019     Lab Results    Component Value Date    HCT 45.1 03/05/2019     Lab Results   Component Value Date    MCV 92 03/05/2019     Lab Results   Component Value Date    MCH 29.5 03/05/2019     Lab Results   Component Value Date    MCHC 32.2 03/05/2019     Lab Results   Component Value Date    RDW 13.6 03/05/2019     Lab Results   Component Value Date     03/05/2019       No results found for: INR     Computed MELD-Na score unavailable. Necessary lab results were not found in the last year.  Computed MELD score unavailable. Necessary lab results were not found in the last year.    Imaging:    Fibroscan 5/2021    FINDINGS:   Ultrasound elastography of the liver was performed using a Ndiaye  ultrasound machine using 10 separate measurements of the liver  parenchyma with patient in supine position. Measurements were obtained  approximately 2 cm beneath Jayce's capsule, perpendicular to the  liver capsule. Images are of satisfactory quality.     The median liver stiffness is: 1.88 m/sec  The mean liver stiffness is: 1.89 m/sec  The interquartile range is: 0.179  IQR/median: 0.09.  (IQR/median value of greater than 0.15 indicates  that a dataset is unreliable)                                                                      IMPRESSION:  The median liver stiffness is 1.88 m/sec and the IQR/median value is  0.09 . This is a high elastography value suggestive of advanced  chronic liver disease.        Consensus criteria for interpreting liver stiffness values in patients  with viral hepatitis or NAFLD according to SRU consensus guidelines  (Lopez et. al. Radiology: 2020. epub)     High probability of being normal:  <1.3 m/sec  Rules out advanced chronic liver disease in asymptomatic patients:  <1.7 m/sec  Suggestive of advanced chronic liver disease: 1.7-2.1 m/sec  Indicates advanced chronic liver disease: >2.1 m/sec  Suggestive of clinically significant portal hypertension: >2.4 m/sec    Endoscopy:    EGD 1/2021    Findings:         LA Grade A (one or more mucosal breaks less than 5 mm, not extending        between tops of 2 mucosal folds) esophagitis was found 35 to 36 cm from        the incisors. Biopsies were taken with a cold forceps for histology.        The Z-line was regular and was found 36 cm from the incisors.        The gastroesophageal flap valve was visualized endoscopically and        classified as Hill Grade III (minimal fold, loose to endoscope, hiatal        hernia likely).        The entire examined stomach was normal. Biopsies were taken with a cold        forceps for Helicobacter pylori testing.        The duodenal bulb and second portion of the duodenum were normal.        The exam was otherwise without abnormality.                                                                                     Impression:          Mild reflux esophagitis- biopsied. Biopsied to rule out                        H pylori. No endoscopic findings during today's exam to                        account for severe symptoms. Patient became abruptly                        tachycardic () at end of procedure which                        terminated with Valsalva maneuver.                        - LA Grade A reflux esophagitis. Biopsied.                        - Z-line regular, 36 cm from the incisors.                        - Gastroesophageal flap valve classified as Hill Grade                        III (minimal fold, loose to endoscope, hiatal hernia                        likely).                        - Normal stomach. Biopsied.                        - Normal duodenal bulb and second portion of the                        duodenum.                        - The examination was otherwise normal.     FINAL DIAGNOSIS:   A. STOMACH, BIOPSIES:   - Gastric body and antral type mucosa with mild chronic inflammation   - No H. pylori like organisms identified on routine staining   - Negative for intestinal metaplasia or dysplasia     B. ESOPHAGUS,  BIOPSY:   - Metaplastic columnar epithelium with goblet cells, consistent with   Cheung's esophagus; negative for   dysplasia   - Squamous epithelium with increased intraepithelial eosinophils (up to 50    per high power field), consistent   with eosinophilic esophagitis       Independently reviewed labs and imaging.     Assessment/Plan: Ms. 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    Discussed her US elastography results that are showing moderate fibrosis. On recent US her liver looked enlarged and with steatosis. She does not drink alcohol. Has risk factors for NAFLD but it is interesting to note that her liver appeared normal when she weighed more in the past.     Discussed ruling out other causes of chronic liver disease. She feels her abdomen is more swollen, does not have appreciable ascites on exam but can evaluate this on her upcoming CT scan scheduled for her other GI symptoms. Discussed I have a low suspicion she has cirrhosis based on her work up, and would not attribute her liver disease to explain her nausea, vomiting weight loss    - Continue to follow in GI clinic for her GI symptoms as well as esophageal eosinophils. Agree with CT scan and repeat EGD to follow up eosinophils and cheung's esophagus  - Serologic work up for other causes of liver disease  - Continue to avoid alcohol, recommend maintaining a healthy weight for presumed NAFLD    Orders Placed This Encounter   Procedures     Hepatitis C antibody     Hepatitis B surface antigen     Hepatitis B core antibody     Alpha 1 antitryp jennifer reflex to pheno     Ferritin     Iron and iron binding capacity     Tissue transglutaminase alicja IgA and IgG     Hepatic panel (Albumin, ALT, AST, Bili, Alk Phos, TP)     RTC 6 months.    Alea Valdes MD MS  Hepatology/Liver  Transplant  [facility]    Addendum: LFTs normal on OSH labs, hepatitis B/C testing, iron studies, A1AT normal

## 2021-10-13 NOTE — LETTER
10/13/2021         RE: Kaylah Arreola  [address]  [address]  [facility] [address]      [facility] New Patient Visit    Date of Visit: October 13, 2021    Reason for referral: fatty liver, chronic liver disease    Subjective: Ms. 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    She reports she had jaundice within the first few months of life, and again a few months ago, but otherwise denies a history of liver disease or decompensation.     2019 Liver US normal  3/2021 Liver US showing fatty, enlarged liver    12/2018, 3/2019, 9/2019, 2/2021, 6/2021 LFTs normal    She had US elastography 5/2021 that showed median liver stiffness 1.89 m/sec suggestive of advanced chronic liver disease. She is very worried about her liver, has been reading a lot about it online and feels because of this she has only a few years to live.     Does not drink alcohol, last drink was 20 years ago. She reports weight loss related to her gastrointestinal issues. Followed in [facility] for this. She has regular nausea, vomiting, abdominal pain in her RUQ. She cannot eat and is losing weight. GI has recommended a CT scan and a colonoscopy (she has declined this). She has a follow up EGD scheduled soon. Is on swallowed steroids but does not think they are working anymore. She reports losing 70 lbs, was 214 lbs 5/2019, now 170 lbs. She is tearful and very frustrated with her medical issues.      ROS: 14 point ROS negative except for positives noted in HPI.    PMHx:  Past Medical History:   Diagnosis Date     Anxiety      AV block, 3rd degree (H)      Depression      Fibromyalgia      GERD (gastroesophageal reflux disease)      Syncope, cardiogenic    EoE  Constantino's esophagus  Leopard syndrome    PSHx:  Past Surgical History:   Procedure  Laterality Date     EP PACEMAKER       ESOPHAGOSCOPY, GASTROSCOPY, DUODENOSCOPY (EGD), COMBINED  01/07/2021     ESOPHAGOSCOPY, GASTROSCOPY, DUODENOSCOPY (EGD), COMBINED N/A 1/7/2021    Procedure: ESOPHAGOGASTRODUODENOSCOPY (EGD);  Surgeon: Rufino Hoover MD;  Location: [facility]     HYSTERECTOMY         FamHx:  No family history of liver disease, liver cancer  Sister with breast cancer x 3  Sister with ovarian cancer    SocHx:  Social History     Socioeconomic History     Marital status:      Spouse name: Not on file     Number of children: Not on file     Years of education: Not on file     Highest education level: Not on file   Occupational History     Not on file   Tobacco Use     Smoking status: Former Smoker     Smokeless tobacco: Never Used   Substance and Sexual Activity     Alcohol use: Not Currently     Drug use: Yes     Types: Marijuana     Comment: medical     Sexual activity: Not on file   Other Topics Concern     Parent/sibling w/ CABG, MI or angioplasty before 65F 55M? Not Asked   Social History Narrative     Not on file     Social Determinants of Health     Financial Resource Strain:      Difficulty of Paying Living Expenses:    Food Insecurity:      Worried About Running Out of Food in the Last Year:      Ran Out of Food in the Last Year:    Transportation Needs:      Lack of Transportation (Medical):      Lack of Transportation (Non-Medical):    Physical Activity:      Days of Exercise per Week:      Minutes of Exercise per Session:    Stress:      Feeling of Stress :    Social Connections:      Frequency of Communication with Friends and Family:      Frequency of Social Gatherings with Friends and Family:      Attends [employer] Services:      Active Member of Clubs or Organizations:      Attends Club or Organization Meetings:      Marital Status:    Intimate Partner Violence:      Fear of Current or Ex-Partner:      Emotionally Abused:      Physically Abused:      Sexually Abused:         Medications:  Current Outpatient Medications   Medication     atorvastatin (LIPITOR) 20 MG tablet     busPIRone (BUSPAR) 10 MG tablet     EMGALITY 120 MG/ML injection     escitalopram (LEXAPRO) 5 MG tablet     hydroxychloroquine (PLAQUENIL) 200 MG tablet     hydrOXYzine (VISTARIL) 25 MG capsule     levothyroxine (SYNTHROID/LEVOTHROID) 112 MCG tablet     levothyroxine (SYNTHROID/LEVOTHROID) 125 MCG tablet     liothyronine (CYTOMEL) 5 MCG tablet     loratadine (CLARITIN) 10 MG tablet     LORazepam (ATIVAN) 1 MG tablet     metoprolol succinate ER (TOPROL-XL) 25 MG 24 hr tablet     nitroGLYcerin (NITROSTAT) 0.4 MG sublingual tablet     ondansetron (ZOFRAN) 4 MG tablet     tiZANidine (ZANAFLEX) 4 MG tablet     topiramate (TOPAMAX) 100 MG tablet     traMADol (ULTRAM) 50 MG tablet     budesonide (PULMICORT) 0.5 MG/2ML neb solution     buPROPion (WELLBUTRIN SR) 200 MG 12 hr tablet     COMPOUNDED NON-CONTROLLED SUBSTANCE (CMPD RX) - PHARMACY TO MIX COMPOUNDED MEDICATION     ondansetron (ZOFRAN-ODT) 4 MG ODT tab     pantoprazole (PROTONIX) 40 MG EC tablet     sertraline (ZOLOFT) 100 MG tablet     sertraline (ZOLOFT) 50 MG tablet     vitamin D3 (CHOLECALCIFEROL) 2000 units tablet     No current facility-administered medications for this visit.     No OTCs, herbals    Allergies:  Allergies   Allergen Reactions     Codeine Nausea     Other reaction(s): GI Upset, Vomiting  headaches       Hydrocodone-Acetaminophen Nausea and Nausea and Vomiting     Other reaction(s): Headache, Vomiting  headache       Metronidazole Unknown     Midodrine      Bloating.     Phenytoin Unknown     Other reaction(s): *Unknown     Ranitidine      sensitivity  Severe depression       Clarithromycin Rash     Other reaction(s): Vomiting     Metoclopramide Rash     depression    Other reaction(s): Emotional Disturbance  depression     Sulfa Drugs Hives and Rash       Objective:  /79 (BP Location: Right arm, Patient Position: Sitting, Cuff Size:  Adult Large)   Pulse 85   Temp 99.2  F (37.3  C) (Oral)   Resp 18   Ht 1.524 m (5')   Wt 77.4 kg (170 lb 9.6 oz)   SpO2 97%   BMI 33.32 kg/m    Constitutional: pleasant woman in NAD  Eyes: non icteric  Respiratory: Normal respiratory excursion   MSK: normal range of motion of visualized extremities  Abd: Non distended  Skin: No jaundice  Psychiatric: normal mood and orientation    Labs:  Last Comprehensive Metabolic Panel:  Sodium   Date Value Ref Range Status   03/05/2019 141 133 - 144 mmol/L Final     Potassium   Date Value Ref Range Status   03/05/2019 4.4 3.4 - 5.3 mmol/L Final     Chloride   Date Value Ref Range Status   03/05/2019 110 (H) 94 - 109 mmol/L Final     Carbon Dioxide   Date Value Ref Range Status   03/05/2019 24 20 - 32 mmol/L Final     Anion Gap   Date Value Ref Range Status   03/05/2019 7 3 - 14 mmol/L Final     Glucose   Date Value Ref Range Status   03/05/2019 123 (H) 70 - 99 mg/dL Final     Comment:     Non Fasting     Urea Nitrogen   Date Value Ref Range Status   03/05/2019 17 7 - 30 mg/dL Final     Creatinine   Date Value Ref Range Status   03/05/2019 0.94 0.52 - 1.04 mg/dL Final     GFR Estimate   Date Value Ref Range Status   03/05/2019 68 >60 mL/min/[1.73_m2] Final     Comment:     Non  GFR Calc  Starting 12/18/2018, serum creatinine based estimated GFR (eGFR) will be   calculated using the Chronic Kidney Disease Epidemiology Collaboration   (CKD-EPI) equation.       Calcium   Date Value Ref Range Status   03/05/2019 9.3 8.5 - 10.1 mg/dL Final     Bilirubin Total   Date Value Ref Range Status   03/05/2019 0.3 0.2 - 1.3 mg/dL Final     Alkaline Phosphatase   Date Value Ref Range Status   03/05/2019 98 40 - 150 U/L Final     ALT   Date Value Ref Range Status   03/05/2019 26 0 - 50 U/L Final     AST   Date Value Ref Range Status   03/05/2019 11 0 - 45 U/L Final       Lab Results   Component Value Date    WBC 15.1 03/05/2019     Lab Results   Component Value Date    RBC  4.92 03/05/2019     Lab Results   Component Value Date    HGB 14.5 03/05/2019     Lab Results   Component Value Date    HCT 45.1 03/05/2019     Lab Results   Component Value Date    MCV 92 03/05/2019     Lab Results   Component Value Date    MCH 29.5 03/05/2019     Lab Results   Component Value Date    MCHC 32.2 03/05/2019     Lab Results   Component Value Date    RDW 13.6 03/05/2019     Lab Results   Component Value Date     03/05/2019       No results found for: INR     Computed MELD-Na score unavailable. Necessary lab results were not found in the last year.  Computed MELD score unavailable. Necessary lab results were not found in the last year.    Imaging:    Fibroscan 5/2021    FINDINGS:   Ultrasound elastography of the liver was performed using a Ndiaye  ultrasound machine using 10 separate measurements of the liver  parenchyma with patient in supine position. Measurements were obtained  approximately 2 cm beneath Jayce's capsule, perpendicular to the  liver capsule. Images are of satisfactory quality.     The median liver stiffness is: 1.88 m/sec  The mean liver stiffness is: 1.89 m/sec  The interquartile range is: 0.179  IQR/median: 0.09.  (IQR/median value of greater than 0.15 indicates  that a dataset is unreliable)                                                                      IMPRESSION:  The median liver stiffness is 1.88 m/sec and the IQR/median value is  0.09 . This is a high elastography value suggestive of advanced  chronic liver disease.        Consensus criteria for interpreting liver stiffness values in patients  with viral hepatitis or NAFLD according to SRU consensus guidelines  (John et. al. Radiology: 2020. epub)     High probability of being normal:  <1.3 m/sec  Rules out advanced chronic liver disease in asymptomatic patients:  <1.7 m/sec  Suggestive of advanced chronic liver disease: 1.7-2.1 m/sec  Indicates advanced chronic liver disease: >2.1 m/sec  Suggestive of  clinically significant portal hypertension: >2.4 m/sec    Endoscopy:    EGD 1/2021    Findings:        LA Grade A (one or more mucosal breaks less than 5 mm, not extending        between tops of 2 mucosal folds) esophagitis was found 35 to 36 cm from        the incisors. Biopsies were taken with a cold forceps for histology.        The Z-line was regular and was found 36 cm from the incisors.        The gastroesophageal flap valve was visualized endoscopically and        classified as Hill Grade III (minimal fold, loose to endoscope, hiatal        hernia likely).        The entire examined stomach was normal. Biopsies were taken with a cold        forceps for Helicobacter pylori testing.        The duodenal bulb and second portion of the duodenum were normal.        The exam was otherwise without abnormality.                                                                                     Impression:          Mild reflux esophagitis- biopsied. Biopsied to rule out                        H pylori. No endoscopic findings during today's exam to                        account for severe symptoms. Patient became abruptly                        tachycardic () at end of procedure which                        terminated with Valsalva maneuver.                        - LA Grade A reflux esophagitis. Biopsied.                        - Z-line regular, 36 cm from the incisors.                        - Gastroesophageal flap valve classified as Hill Grade                        III (minimal fold, loose to endoscope, hiatal hernia                        likely).                        - Normal stomach. Biopsied.                        - Normal duodenal bulb and second portion of the                        duodenum.                        - The examination was otherwise normal.     FINAL DIAGNOSIS:   A. STOMACH, BIOPSIES:   - Gastric body and antral type mucosa with mild chronic inflammation   - No H. pylori like organisms  identified on routine staining   - Negative for intestinal metaplasia or dysplasia     B. ESOPHAGUS, BIOPSY:   - Metaplastic columnar epithelium with goblet cells, consistent with   Cheung's esophagus; negative for   dysplasia   - Squamous epithelium with increased intraepithelial eosinophils (up to 50    per high power field), consistent   with eosinophilic esophagitis       Independently reviewed labs and imaging.     Assessment/Plan: Ms. 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    Discussed her US elastography results that are showing moderate fibrosis. On recent US her liver looked enlarged and with steatosis. She does not drink alcohol. Has risk factors for NAFLD but it is interesting to note that her liver appeared normal when she weighed more in the past.     Discussed ruling out other causes of chronic liver disease. She feels her abdomen is more swollen, does not have appreciable ascites on exam but can evaluate this on her upcoming CT scan scheduled for her other GI symptoms. Discussed I have a low suspicion she has cirrhosis based on her work up, and would not attribute her liver disease to explain her nausea, vomiting weight loss    - Continue to follow in GI clinic for her GI symptoms as well as esophageal eosinophils. Agree with CT scan and repeat EGD to follow up eosinophils and cheung's esophagus  - Serologic work up for other causes of liver disease  - Continue to avoid alcohol, recommend maintaining a healthy weight for presumed NAFLD    Orders Placed This Encounter   Procedures     Hepatitis C antibody     Hepatitis B surface antigen     Hepatitis B core antibody     Alpha 1 antitryp jennifer reflex to pheno     Ferritin     Iron and iron binding capacity     Tissue transglutaminase alicja IgA and IgG     Hepatic panel  (Albumin, ALT, AST, Bili, Alk Phos, TP)     RTC 6 months.    Alea Valdes MD MS  Hepatology/Liver Transplant  [facility]    Addendum: LFTs normal on OSH labs, hepatitis B/C testing, iron studies, A1AT normal          Alea Valdes MD

## 2021-10-13 NOTE — LETTER
10/13/2021         RE: Kaylah Arreola  [address]  [address]  [facility] [address]        Dear Colleague,    Thank you for referring your patient, Kaylah Arreola, to the [facility] [place]. Please see a copy of my visit note below.    [facility] New Patient Visit    Date of Visit: October 13, 2021    Reason for referral: fatty liver, chronic liver disease    Subjective: Ms. 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    She reports she had jaundice within the first few months of life, and again a few months ago, but otherwise denies a history of liver disease or decompensation.     2019 Liver US normal  3/2021 Liver US showing fatty, enlarged liver    12/2018, 3/2019, 9/2019, 2/2021, 6/2021 LFTs normal    She had US elastography 5/2021 that showed median liver stiffness 1.89 m/sec suggestive of advanced chronic liver disease. She is very worried about her liver, has been reading a lot about it online and feels because of this she has only a few years to live.     Does not drink alcohol, last drink was 20 years ago. She reports weight loss related to her gastrointestinal issues. Followed in [facility] for this. She has regular nausea, vomiting, abdominal pain in her RUQ. She cannot eat and is losing weight. GI has recommended a CT scan and a colonoscopy (she has declined this). She has a follow up EGD scheduled soon. Is on swallowed steroids but does not think they are working anymore. She reports losing 70 lbs, was 214 lbs 5/2019, now 170 lbs. She is tearful and very frustrated with her medical issues.      ROS: 14 point ROS negative except for positives noted in HPI.    PMHx:  Past Medical History:   Diagnosis Date     Anxiety      AV block, 3rd degree (H)      Depression       Fibromyalgia      GERD (gastroesophageal reflux disease)      Syncope, cardiogenic    EoE  Constantino's esophagus  Leopard syndrome    PSHx:  Past Surgical History:   Procedure Laterality Date     EP PACEMAKER       ESOPHAGOSCOPY, GASTROSCOPY, DUODENOSCOPY (EGD), COMBINED  01/07/2021     ESOPHAGOSCOPY, GASTROSCOPY, DUODENOSCOPY (EGD), COMBINED N/A 1/7/2021    Procedure: ESOPHAGOGASTRODUODENOSCOPY (EGD);  Surgeon: Rufino Hoover MD;  Location:  GI     HYSTERECTOMY         FamHx:  No family history of liver disease, liver cancer  Sister with breast cancer x 3  Sister with ovarian cancer    SocHx:  Social History     Socioeconomic History     Marital status:      Spouse name: Not on file     Number of children: Not on file     Years of education: Not on file     Highest education level: Not on file   Occupational History     Not on file   Tobacco Use     Smoking status: Former Smoker     Smokeless tobacco: Never Used   Substance and Sexual Activity     Alcohol use: Not Currently     Drug use: Yes     Types: Marijuana     Comment: medical     Sexual activity: Not on file   Other Topics Concern     Parent/sibling w/ CABG, MI or angioplasty before 65F 55M? Not Asked   Social History Narrative     Not on file     Social Determinants of Health     Financial Resource Strain:      Difficulty of Paying Living Expenses:    Food Insecurity:      Worried About Running Out of Food in the Last Year:      Ran Out of Food in the Last Year:    Transportation Needs:      Lack of Transportation (Medical):      Lack of Transportation (Non-Medical):    Physical Activity:      Days of Exercise per Week:      Minutes of Exercise per Session:    Stress:      Feeling of Stress :    Social Connections:      Frequency of Communication with Friends and Family:      Frequency of Social Gatherings with Friends and Family:      Attends Islam Services:      Active Member of Clubs or Organizations:      Attends Club or Organization  Meetings:      Marital Status:    Intimate Partner Violence:      Fear of Current or Ex-Partner:      Emotionally Abused:      Physically Abused:      Sexually Abused:        Medications:  Current Outpatient Medications   Medication     atorvastatin (LIPITOR) 20 MG tablet     busPIRone (BUSPAR) 10 MG tablet     EMGALITY 120 MG/ML injection     escitalopram (LEXAPRO) 5 MG tablet     hydroxychloroquine (PLAQUENIL) 200 MG tablet     hydrOXYzine (VISTARIL) 25 MG capsule     levothyroxine (SYNTHROID/LEVOTHROID) 112 MCG tablet     levothyroxine (SYNTHROID/LEVOTHROID) 125 MCG tablet     liothyronine (CYTOMEL) 5 MCG tablet     loratadine (CLARITIN) 10 MG tablet     LORazepam (ATIVAN) 1 MG tablet     metoprolol succinate ER (TOPROL-XL) 25 MG 24 hr tablet     nitroGLYcerin (NITROSTAT) 0.4 MG sublingual tablet     ondansetron (ZOFRAN) 4 MG tablet     tiZANidine (ZANAFLEX) 4 MG tablet     topiramate (TOPAMAX) 100 MG tablet     traMADol (ULTRAM) 50 MG tablet     budesonide (PULMICORT) 0.5 MG/2ML neb solution     buPROPion (WELLBUTRIN SR) 200 MG 12 hr tablet     COMPOUNDED NON-CONTROLLED SUBSTANCE (CMPD RX) - PHARMACY TO MIX COMPOUNDED MEDICATION     ondansetron (ZOFRAN-ODT) 4 MG ODT tab     pantoprazole (PROTONIX) 40 MG EC tablet     sertraline (ZOLOFT) 100 MG tablet     sertraline (ZOLOFT) 50 MG tablet     vitamin D3 (CHOLECALCIFEROL) 2000 units tablet     No current facility-administered medications for this visit.     No OTCs, herbals    Allergies:  Allergies   Allergen Reactions     Codeine Nausea     Other reaction(s): GI Upset, Vomiting  headaches       Hydrocodone-Acetaminophen Nausea and Nausea and Vomiting     Other reaction(s): Headache, Vomiting  headache       Metronidazole Unknown     Midodrine      Bloating.     Phenytoin Unknown     Other reaction(s): *Unknown     Ranitidine      sensitivity  Severe depression       Clarithromycin Rash     Other reaction(s): Vomiting     Metoclopramide Rash     depression    Other  reaction(s): Emotional Disturbance  depression     Sulfa Drugs Hives and Rash       Objective:  /79 (BP Location: Right arm, Patient Position: Sitting, Cuff Size: Adult Large)   Pulse 85   Temp 99.2  F (37.3  C) (Oral)   Resp 18   Ht 1.524 m (5')   Wt 77.4 kg (170 lb 9.6 oz)   SpO2 97%   BMI 33.32 kg/m    Constitutional: pleasant woman in NAD  Eyes: non icteric  Respiratory: Normal respiratory excursion   MSK: normal range of motion of visualized extremities  Abd: Non distended  Skin: No jaundice  Psychiatric: normal mood and orientation    Labs:  Last Comprehensive Metabolic Panel:  Sodium   Date Value Ref Range Status   03/05/2019 141 133 - 144 mmol/L Final     Potassium   Date Value Ref Range Status   03/05/2019 4.4 3.4 - 5.3 mmol/L Final     Chloride   Date Value Ref Range Status   03/05/2019 110 (H) 94 - 109 mmol/L Final     Carbon Dioxide   Date Value Ref Range Status   03/05/2019 24 20 - 32 mmol/L Final     Anion Gap   Date Value Ref Range Status   03/05/2019 7 3 - 14 mmol/L Final     Glucose   Date Value Ref Range Status   03/05/2019 123 (H) 70 - 99 mg/dL Final     Comment:     Non Fasting     Urea Nitrogen   Date Value Ref Range Status   03/05/2019 17 7 - 30 mg/dL Final     Creatinine   Date Value Ref Range Status   03/05/2019 0.94 0.52 - 1.04 mg/dL Final     GFR Estimate   Date Value Ref Range Status   03/05/2019 68 >60 mL/min/[1.73_m2] Final     Comment:     Non  GFR Calc  Starting 12/18/2018, serum creatinine based estimated GFR (eGFR) will be   calculated using the Chronic Kidney Disease Epidemiology Collaboration   (CKD-EPI) equation.       Calcium   Date Value Ref Range Status   03/05/2019 9.3 8.5 - 10.1 mg/dL Final     Bilirubin Total   Date Value Ref Range Status   03/05/2019 0.3 0.2 - 1.3 mg/dL Final     Alkaline Phosphatase   Date Value Ref Range Status   03/05/2019 98 40 - 150 U/L Final     ALT   Date Value Ref Range Status   03/05/2019 26 0 - 50 U/L Final     AST    Date Value Ref Range Status   03/05/2019 11 0 - 45 U/L Final       Lab Results   Component Value Date    WBC 15.1 03/05/2019     Lab Results   Component Value Date    RBC 4.92 03/05/2019     Lab Results   Component Value Date    HGB 14.5 03/05/2019     Lab Results   Component Value Date    HCT 45.1 03/05/2019     Lab Results   Component Value Date    MCV 92 03/05/2019     Lab Results   Component Value Date    MCH 29.5 03/05/2019     Lab Results   Component Value Date    MCHC 32.2 03/05/2019     Lab Results   Component Value Date    RDW 13.6 03/05/2019     Lab Results   Component Value Date     03/05/2019       No results found for: INR     Computed MELD-Na score unavailable. Necessary lab results were not found in the last year.  Computed MELD score unavailable. Necessary lab results were not found in the last year.    Imaging:    Fibroscan 5/2021    FINDINGS:   Ultrasound elastography of the liver was performed using a Ndiaye  ultrasound machine using 10 separate measurements of the liver  parenchyma with patient in supine position. Measurements were obtained  approximately 2 cm beneath Jayce's capsule, perpendicular to the  liver capsule. Images are of satisfactory quality.     The median liver stiffness is: 1.88 m/sec  The mean liver stiffness is: 1.89 m/sec  The interquartile range is: 0.179  IQR/median: 0.09.  (IQR/median value of greater than 0.15 indicates  that a dataset is unreliable)                                                                      IMPRESSION:  The median liver stiffness is 1.88 m/sec and the IQR/median value is  0.09 . This is a high elastography value suggestive of advanced  chronic liver disease.        Consensus criteria for interpreting liver stiffness values in patients  with viral hepatitis or NAFLD according to SRU consensus guidelines  (Lopez et. al. Radiology: 2020. epub)     High probability of being normal:  <1.3 m/sec  Rules out advanced chronic liver disease in  asymptomatic patients:  <1.7 m/sec  Suggestive of advanced chronic liver disease: 1.7-2.1 m/sec  Indicates advanced chronic liver disease: >2.1 m/sec  Suggestive of clinically significant portal hypertension: >2.4 m/sec    Endoscopy:    EGD 1/2021    Findings:        LA Grade A (one or more mucosal breaks less than 5 mm, not extending        between tops of 2 mucosal folds) esophagitis was found 35 to 36 cm from        the incisors. Biopsies were taken with a cold forceps for histology.        The Z-line was regular and was found 36 cm from the incisors.        The gastroesophageal flap valve was visualized endoscopically and        classified as Hill Grade III (minimal fold, loose to endoscope, hiatal        hernia likely).        The entire examined stomach was normal. Biopsies were taken with a cold        forceps for Helicobacter pylori testing.        The duodenal bulb and second portion of the duodenum were normal.        The exam was otherwise without abnormality.                                                                                     Impression:          Mild reflux esophagitis- biopsied. Biopsied to rule out                        H pylori. No endoscopic findings during today's exam to                        account for severe symptoms. Patient became abruptly                        tachycardic () at end of procedure which                        terminated with Valsalva maneuver.                        - LA Grade A reflux esophagitis. Biopsied.                        - Z-line regular, 36 cm from the incisors.                        - Gastroesophageal flap valve classified as Hill Grade                        III (minimal fold, loose to endoscope, hiatal hernia                        likely).                        - Normal stomach. Biopsied.                        - Normal duodenal bulb and second portion of the                        duodenum.                        - The examination was  otherwise normal.     FINAL DIAGNOSIS:   A. STOMACH, BIOPSIES:   - Gastric body and antral type mucosa with mild chronic inflammation   - No H. pylori like organisms identified on routine staining   - Negative for intestinal metaplasia or dysplasia     B. ESOPHAGUS, BIOPSY:   - Metaplastic columnar epithelium with goblet cells, consistent with   Cheung's esophagus; negative for   dysplasia   - Squamous epithelium with increased intraepithelial eosinophils (up to 50    per high power field), consistent   with eosinophilic esophagitis       Independently reviewed labs and imaging.     Assessment/Plan: Ms. 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    Discussed her US elastography results that are showing moderate fibrosis. On recent US her liver looked enlarged and with steatosis. She does not drink alcohol. Has risk factors for NAFLD but it is interesting to note that her liver appeared normal when she weighed more in the past.     Discussed ruling out other causes of chronic liver disease. She feels her abdomen is more swollen, does not have appreciable ascites on exam but can evaluate this on her upcoming CT scan scheduled for her other GI symptoms. Discussed I have a low suspicion she has cirrhosis based on her work up, and would not attribute her liver disease to explain her nausea, vomiting weight loss    - Continue to follow in GI clinic for her GI symptoms as well as esophageal eosinophils. Agree with CT scan and repeat EGD to follow up eosinophils and cheung's esophagus  - Serologic work up for other causes of liver disease  - Continue to avoid alcohol, recommend maintaining a healthy weight for presumed NAFLD    Orders Placed This Encounter   Procedures     Hepatitis C antibody     Hepatitis B surface antigen     Hepatitis B core  antibody     Alpha 1 antitryp jennifer reflex to pheno     Ferritin     Iron and iron binding capacity     Tissue transglutaminase alicja IgA and IgG     Hepatic panel (Albumin, ALT, AST, Bili, Alk Phos, TP)     RTC 6 months.    Alea Valdes MD MS  Hepatology/Liver Transplant  [facility]    Addendum: LFTs normal on OSH labs, hepatitis B/C testing, iron studies, A1AT normal        Again, thank you for allowing me to participate in the care of your patient.        Sincerely,        Alea Valdes MD

## 2021-10-13 NOTE — NURSING NOTE
Chief Complaint   Patient presents with     Consult     Fatty liver     Complains of lack of appetite and vomiting.     Vital signs:  Temp: 99.2  F (37.3  C) Temp src: Oral BP: 124/79 Pulse: 85   Resp: 18 SpO2: 97 %     Height: 152.4 cm (5') Weight: 77.4 kg (170 lb 9.6 oz)  Estimated body mass index is 33.32 kg/m  as calculated from the following:    Height as of this encounter: 1.524 m (5').    Weight as of this encounter: 77.4 kg (170 lb 9.6 oz).        Krissy Maria, [facility]  10/13/2021 2:57 PM

## 2021-10-15 ENCOUNTER — TELEPHONE (OUTPATIENT)
Dept: GASTROENTEROLOGY | Facility: CLINIC | Age: 56
End: 2021-10-15

## 2021-10-15 NOTE — TELEPHONE ENCOUNTER
Pre assessment questions completed for upcoming EGD procedure scheduled on 10/28/21    COVID test scheduled 10/25/21    Pre op exam scheduled 10/19/21 at [facility] in [place] with Dr. Roberto.     Procedural arrival time and facility location reviewed.    Designated  policy reviewed. Instructed to have someone stay 24 hours post procedure.     Reviewed EGD prep instructions with patient. NPO six hours prior to procedure.     Anticoagulation/blood thinners? no    Electronic implanted devices? pacemaker    Patient verbalized understanding and had no questions or concerns at this time.    Amie Hall RN

## 2021-10-21 ENCOUNTER — ANCILLARY PROCEDURE (OUTPATIENT)
Dept: CT IMAGING | Facility: CLINIC | Age: 56
End: 2021-10-21
Attending: PHYSICIAN ASSISTANT
Payer: MEDICARE

## 2021-10-21 DIAGNOSIS — R10.84 ABDOMINAL PAIN, GENERALIZED: ICD-10-CM

## 2021-10-21 PROCEDURE — G1004 CDSM NDSC: HCPCS | Mod: GC | Performed by: STUDENT IN AN ORGANIZED HEALTH CARE EDUCATION/TRAINING PROGRAM

## 2021-10-21 PROCEDURE — 74177 CT ABD & PELVIS W/CONTRAST: CPT | Mod: MG | Performed by: STUDENT IN AN ORGANIZED HEALTH CARE EDUCATION/TRAINING PROGRAM

## 2021-10-21 RX ORDER — IOPAMIDOL 755 MG/ML
135 INJECTION, SOLUTION INTRAVASCULAR ONCE
Status: COMPLETED | OUTPATIENT
Start: 2021-10-21 | End: 2021-10-21

## 2021-10-21 RX ADMIN — IOPAMIDOL 104 ML: 755 INJECTION, SOLUTION INTRAVASCULAR at 15:31

## 2021-10-25 ENCOUNTER — LAB (OUTPATIENT)
Dept: LAB | Facility: CLINIC | Age: 56
End: 2021-10-25
Attending: INTERNAL MEDICINE
Payer: MEDICARE

## 2021-10-25 DIAGNOSIS — Z11.59 ENCOUNTER FOR SCREENING FOR OTHER VIRAL DISEASES: ICD-10-CM

## 2021-10-25 PROCEDURE — U0005 INFEC AGEN DETEC AMPLI PROBE: HCPCS

## 2021-10-25 PROCEDURE — U0003 INFECTIOUS AGENT DETECTION BY NUCLEIC ACID (DNA OR RNA); SEVERE ACUTE RESPIRATORY SYNDROME CORONAVIRUS 2 (SARS-COV-2) (CORONAVIRUS DISEASE [COVID-19]), AMPLIFIED PROBE TECHNIQUE, MAKING USE OF HIGH THROUGHPUT TECHNOLOGIES AS DESCRIBED BY CMS-2020-01-R: HCPCS

## 2021-10-26 LAB — SARS-COV-2 RNA RESP QL NAA+PROBE: NEGATIVE

## 2021-10-27 ENCOUNTER — ANESTHESIA EVENT (OUTPATIENT)
Dept: GASTROENTEROLOGY | Facility: CLINIC | Age: 56
End: 2021-10-27
Payer: MEDICARE

## 2021-10-28 ENCOUNTER — ANESTHESIA (OUTPATIENT)
Dept: GASTROENTEROLOGY | Facility: CLINIC | Age: 56
End: 2021-10-28
Payer: MEDICARE

## 2021-10-28 ENCOUNTER — HOSPITAL ENCOUNTER (OUTPATIENT)
Facility: CLINIC | Age: 56
Discharge: HOME | End: 2021-10-28
Attending: INTERNAL MEDICINE | Admitting: INTERNAL MEDICINE
Payer: MEDICARE

## 2021-10-28 VITALS
TEMPERATURE: 98 F | SYSTOLIC BLOOD PRESSURE: 136 MMHG | HEIGHT: 61 IN | RESPIRATION RATE: 12 BRPM | HEART RATE: 68 BPM | DIASTOLIC BLOOD PRESSURE: 72 MMHG | WEIGHT: 169.31 LBS | BODY MASS INDEX: 31.97 KG/M2 | OXYGEN SATURATION: 98 %

## 2021-10-28 LAB — UPPER GI ENDOSCOPY: NORMAL

## 2021-10-28 PROCEDURE — 250N000011 HC RX IP 250 OP 636: Performed by: INTERNAL MEDICINE

## 2021-10-28 PROCEDURE — 250N000011 HC RX IP 250 OP 636: Performed by: NURSE ANESTHETIST, CERTIFIED REGISTERED

## 2021-10-28 PROCEDURE — 250N000009 HC RX 250: Performed by: STUDENT IN AN ORGANIZED HEALTH CARE EDUCATION/TRAINING PROGRAM

## 2021-10-28 PROCEDURE — 88305 TISSUE EXAM BY PATHOLOGIST: CPT | Mod: TC | Performed by: INTERNAL MEDICINE

## 2021-10-28 PROCEDURE — 43239 EGD BIOPSY SINGLE/MULTIPLE: CPT | Performed by: INTERNAL MEDICINE

## 2021-10-28 PROCEDURE — 250N000009 HC RX 250: Performed by: NURSE ANESTHETIST, CERTIFIED REGISTERED

## 2021-10-28 PROCEDURE — 370N000017 HC ANESTHESIA TECHNICAL FEE, PER MIN: Performed by: INTERNAL MEDICINE

## 2021-10-28 PROCEDURE — 258N000003 HC RX IP 258 OP 636: Performed by: NURSE ANESTHETIST, CERTIFIED REGISTERED

## 2021-10-28 PROCEDURE — 250N000013 HC RX MED GY IP 250 OP 250 PS 637: Performed by: INTERNAL MEDICINE

## 2021-10-28 RX ORDER — SODIUM CHLORIDE, SODIUM LACTATE, POTASSIUM CHLORIDE, CALCIUM CHLORIDE 600; 310; 30; 20 MG/100ML; MG/100ML; MG/100ML; MG/100ML
INJECTION, SOLUTION INTRAVENOUS CONTINUOUS PRN
Status: DISCONTINUED | OUTPATIENT
Start: 2021-10-28 | End: 2021-10-28

## 2021-10-28 RX ORDER — ONDANSETRON 2 MG/ML
4 INJECTION INTRAMUSCULAR; INTRAVENOUS
Status: COMPLETED | OUTPATIENT
Start: 2021-10-28 | End: 2021-10-28

## 2021-10-28 RX ORDER — FENTANYL CITRATE 50 UG/ML
25 INJECTION, SOLUTION INTRAMUSCULAR; INTRAVENOUS EVERY 5 MIN PRN
Status: CANCELLED | OUTPATIENT
Start: 2021-10-28

## 2021-10-28 RX ORDER — PROPOFOL 10 MG/ML
INJECTION, EMULSION INTRAVENOUS PRN
Status: DISCONTINUED | OUTPATIENT
Start: 2021-10-28 | End: 2021-10-28

## 2021-10-28 RX ORDER — LABETALOL HYDROCHLORIDE 5 MG/ML
10 INJECTION, SOLUTION INTRAVENOUS
Status: CANCELLED | OUTPATIENT
Start: 2021-10-28

## 2021-10-28 RX ORDER — SODIUM CHLORIDE, SODIUM LACTATE, POTASSIUM CHLORIDE, CALCIUM CHLORIDE 600; 310; 30; 20 MG/100ML; MG/100ML; MG/100ML; MG/100ML
INJECTION, SOLUTION INTRAVENOUS CONTINUOUS
Status: CANCELLED | OUTPATIENT
Start: 2021-10-28

## 2021-10-28 RX ORDER — ONDANSETRON 2 MG/ML
4 INJECTION INTRAMUSCULAR; INTRAVENOUS EVERY 6 HOURS PRN
Status: CANCELLED | OUTPATIENT
Start: 2021-10-28

## 2021-10-28 RX ORDER — ONDANSETRON 4 MG/1
4 TABLET, ORALLY DISINTEGRATING ORAL EVERY 6 HOURS PRN
Status: CANCELLED | OUTPATIENT
Start: 2021-10-28

## 2021-10-28 RX ORDER — ONDANSETRON 2 MG/ML
4 INJECTION INTRAMUSCULAR; INTRAVENOUS EVERY 30 MIN PRN
Status: CANCELLED | OUTPATIENT
Start: 2021-10-28

## 2021-10-28 RX ORDER — NALOXONE HYDROCHLORIDE 0.4 MG/ML
0.4 INJECTION, SOLUTION INTRAMUSCULAR; INTRAVENOUS; SUBCUTANEOUS
Status: CANCELLED | OUTPATIENT
Start: 2021-10-28

## 2021-10-28 RX ORDER — SCOLOPAMINE TRANSDERMAL SYSTEM 1 MG/1
1 PATCH, EXTENDED RELEASE TRANSDERMAL
Status: DISCONTINUED | OUTPATIENT
Start: 2021-10-28 | End: 2021-10-28 | Stop reason: HOSPADM

## 2021-10-28 RX ORDER — LIDOCAINE 40 MG/G
CREAM TOPICAL
Status: DISCONTINUED | OUTPATIENT
Start: 2021-10-28 | End: 2021-10-28 | Stop reason: HOSPADM

## 2021-10-28 RX ORDER — FLUMAZENIL 0.1 MG/ML
0.2 INJECTION, SOLUTION INTRAVENOUS
Status: CANCELLED | OUTPATIENT
Start: 2021-10-28 | End: 2021-10-28

## 2021-10-28 RX ORDER — ONDANSETRON 4 MG/1
4 TABLET, ORALLY DISINTEGRATING ORAL EVERY 30 MIN PRN
Status: CANCELLED | OUTPATIENT
Start: 2021-10-28

## 2021-10-28 RX ORDER — NALOXONE HYDROCHLORIDE 0.4 MG/ML
0.2 INJECTION, SOLUTION INTRAMUSCULAR; INTRAVENOUS; SUBCUTANEOUS
Status: CANCELLED | OUTPATIENT
Start: 2021-10-28

## 2021-10-28 RX ORDER — PROCHLORPERAZINE MALEATE 10 MG
10 TABLET ORAL EVERY 6 HOURS PRN
Status: CANCELLED | OUTPATIENT
Start: 2021-10-28

## 2021-10-28 RX ORDER — SIMETHICONE 40MG/0.6ML
SUSPENSION, DROPS(FINAL DOSAGE FORM)(ML) ORAL PRN
Status: COMPLETED | OUTPATIENT
Start: 2021-10-28 | End: 2021-10-28

## 2021-10-28 RX ORDER — PROPOFOL 10 MG/ML
INJECTION, EMULSION INTRAVENOUS CONTINUOUS PRN
Status: DISCONTINUED | OUTPATIENT
Start: 2021-10-28 | End: 2021-10-28

## 2021-10-28 RX ORDER — HYDROMORPHONE HCL IN WATER/PF 6 MG/30 ML
0.2 PATIENT CONTROLLED ANALGESIA SYRINGE INTRAVENOUS EVERY 5 MIN PRN
Status: CANCELLED | OUTPATIENT
Start: 2021-10-28

## 2021-10-28 RX ADMIN — SCOPALAMINE 1 PATCH: 1 PATCH, EXTENDED RELEASE TRANSDERMAL at 07:32

## 2021-10-28 RX ADMIN — PROPOFOL 20 MG: 10 INJECTION, EMULSION INTRAVENOUS at 08:18

## 2021-10-28 RX ADMIN — ONDANSETRON 4 MG: 2 INJECTION INTRAMUSCULAR; INTRAVENOUS at 07:36

## 2021-10-28 RX ADMIN — SODIUM CHLORIDE, POTASSIUM CHLORIDE, SODIUM LACTATE AND CALCIUM CHLORIDE: 600; 310; 30; 20 INJECTION, SOLUTION INTRAVENOUS at 08:01

## 2021-10-28 RX ADMIN — TOPICAL ANESTHETIC 1 EACH: 200 SPRAY DENTAL; PERIODONTAL at 08:03

## 2021-10-28 RX ADMIN — PROPOFOL 20 MG: 10 INJECTION, EMULSION INTRAVENOUS at 08:10

## 2021-10-28 RX ADMIN — PROPOFOL 30 MG: 10 INJECTION, EMULSION INTRAVENOUS at 08:14

## 2021-10-28 RX ADMIN — PROPOFOL 75 MCG/KG/MIN: 10 INJECTION, EMULSION INTRAVENOUS at 08:02

## 2021-10-28 RX ADMIN — MIDAZOLAM 2 MG: 1 INJECTION INTRAMUSCULAR; INTRAVENOUS at 07:57

## 2021-10-28 RX ADMIN — SIMETHICONE 133 MG: 20 SUSPENSION/ DROPS ORAL at 07:36

## 2021-10-28 ASSESSMENT — MIFFLIN-ST. JEOR: SCORE: 1285.38

## 2021-10-28 NOTE — ANESTHESIA PREPROCEDURE EVALUATION
"Anesthesia Pre-Procedure Evaluation    Patient: Kaylah Arreola   MRN: [id number] : 1965        Preoperative Diagnosis: Upper abdominal pain [R10.10]    Procedure : Procedure(s):  ESOPHAGOGASTRODUODENOSCOPY (EGD)          Past Medical History:   Diagnosis Date     Anxiety      AV block, 3rd degree (H)      Depression      Fibromyalgia      GERD (gastroesophageal reflux disease)      Syncope, cardiogenic       Past Surgical History:   Procedure Laterality Date     EP PACEMAKER       ESOPHAGOSCOPY, GASTROSCOPY, DUODENOSCOPY (EGD), COMBINED  2021     ESOPHAGOSCOPY, GASTROSCOPY, DUODENOSCOPY (EGD), COMBINED N/A 2021    Procedure: ESOPHAGOGASTRODUODENOSCOPY (EGD);  Surgeon: Rufino Hoover MD;  Location: [facility]     HYSTERECTOMY        Allergies   Allergen Reactions     Codeine Nausea     Other reaction(s): GI Upset, Vomiting  headaches       Hydrocodone-Acetaminophen Nausea and Nausea and Vomiting     Other reaction(s): Headache, Vomiting  headache       Metronidazole Unknown     Midodrine      Bloating.     Phenytoin Unknown     Other reaction(s): *Unknown     Ranitidine      sensitivity  Severe depression       Clarithromycin Rash     Other reaction(s): Vomiting     Metoclopramide Rash     depression    Other reaction(s): Emotional Disturbance  depression     Sulfa Drugs Hives and Rash      Social History     Tobacco Use     Smoking status: Former Smoker     Smokeless tobacco: Never Used   Substance Use Topics     Alcohol use: Not Currently      Wt Readings from Last 1 Encounters:   10/28/21 76.8 kg (169 lb 5 oz)        Anesthesia Evaluation            ROS/MED HX  ENT/Pulmonary:  - neg pulmonary ROS     Neurologic:  - neg neurologic ROS     Cardiovascular: Comment: ICD interrogated 2021, Care Everywhere:  \"Patient data sent by remote follow-up. Normal dual chamber pacemaker function.   1. Battery OK, 40% remaining.  2. Available measurements stable.  3. EGM tracing shows an AP-AS/ rhythm.   4. " "Data shows V. pacing 100% of the time.  5.  Data shows no atrial or ventricular high rate episodes detected.  Patient will be contacted with results.  Plan remote follow-up in 3 months.\"    (+) -----ICD Reason placed:3rd degree AVB. - Patient is dependent on ICD.     METS/Exercise Tolerance:     Hematologic:  - neg hematologic  ROS     Musculoskeletal: Comment: Fibromyalgia       GI/Hepatic:     (+) GERD,     Renal/Genitourinary:  - neg Renal ROS     Endo:     (+) Obesity,     Psychiatric/Substance Use:     (+) psychiatric history depression     Infectious Disease:  - neg infectious disease ROS     Malignancy:  - neg malignancy ROS     Other:            Physical Exam    Airway        Mallampati: II   TM distance: > 3 FB   Neck ROM: full   Mouth opening: > 3 cm    Respiratory Devices and Support         Dental           Cardiovascular             Pulmonary                   OUTSIDE LABS:  CBC:   Lab Results   Component Value Date    WBC 15.1 (H) 03/05/2019    HGB 14.5 03/05/2019    HCT 45.1 03/05/2019     03/05/2019     BMP:   Lab Results   Component Value Date     03/05/2019    POTASSIUM 4.4 03/05/2019    POTASSIUM 4.0 12/18/2018    CHLORIDE 110 (H) 03/05/2019    CO2 24 03/05/2019    BUN 17 03/05/2019    CR 0.94 03/05/2019    CR 1.09 (A) 12/18/2018     (H) 03/05/2019    GLC 99 12/18/2018     COAGS: No results found for: PTT, INR, FIBR  POC: No results found for: BGM, HCG, HCGS  HEPATIC:   Lab Results   Component Value Date    ALBUMIN 3.7 03/05/2019    PROTTOTAL 6.8 03/05/2019    ALT 26 03/05/2019    AST 11 03/05/2019    ALKPHOS 98 03/05/2019    BILITOTAL 0.3 03/05/2019     OTHER:   Lab Results   Component Value Date    MARIOLA 9.3 03/05/2019    LIPASE 65 (L) 03/05/2019    AMYLASE 37 03/05/2019    TSH 0.416 12/18/2018    CRP 3.5 03/05/2019       Anesthesia Plan    ASA Status:  3   NPO Status:  NPO Appropriate    Anesthesia Type: MAC.     - Reason for MAC: straight local not clinically adequate "   Induction: Intravenous.   Maintenance: TIVA.        Consents    Anesthesia Plan(s) and associated risks, benefits, and realistic alternatives discussed. Questions answered and patient/representative(s) expressed understanding.     - Discussed with:  Patient      - Patient is DNR/DNI Status: Yes             Suspend during perioperative period? No.         Postoperative Care    Pain management: IV analgesics.   PONV prophylaxis: Ondansetron (or other 5HT-3), Background Propofol Infusion, Dexamethasone or Solumedrol, Scopolamine patch     Comments:    Patient states she wishes to be DNR/DNI status. She does not want to suspend DNI in perioperative period, even if very reversible causes of respiratory failure arise.             Yoon Collins MD

## 2021-10-28 NOTE — ANESTHESIA CARE TRANSFER NOTE
Patient: Kaylah Arreola    Procedure: Procedure(s):  ESOPHAGOGASTRODUODENOSCOPY, WITH BIOPSY       Diagnosis: Upper abdominal pain [R10.10]  Diagnosis Additional Information: No value filed.    Anesthesia Type:   MAC     Note:    Oropharynx: oropharynx clear of all foreign objects and spontaneously breathing  Level of Consciousness: drowsy  Oxygen Supplementation: room air    Independent Airway: airway patency satisfactory and stable  Dentition: dentition unchanged  Vital Signs Stable: post-procedure vital signs reviewed and stable  Report to RN Given: handoff report given  Patient transferred to: Phase II    Handoff Report: Identifed the Patient, Identified the Reponsible Provider, Reviewed the pertinent medical history, Discussed the surgical course, Reviewed Intra-OP anesthesia mangement and issues during anesthesia, Set expectations for post-procedure period and Allowed opportunity for questions and acknowledgement of understanding      Vitals:  Vitals Value Taken Time   BP     Temp     Pulse     Resp     SpO2         Electronically Signed By: KAILYN Smith CRNA  October 28, 2021  8:37 AM

## 2021-10-28 NOTE — ANESTHESIA POSTPROCEDURE EVALUATION
Patient: Kaylah Arreola    Procedure: Procedure(s):  ESOPHAGOGASTRODUODENOSCOPY, WITH BIOPSY       Diagnosis:Upper abdominal pain [R10.10]  Diagnosis Additional Information: No value filed.    Anesthesia Type:  MAC    Note:  Disposition: Outpatient   Postop Pain Control: Uneventful            Sign Out: Well controlled pain   PONV: No   Neuro/Psych: Uneventful            Sign Out: Acceptable/Baseline neuro status   Airway/Respiratory: Uneventful            Sign Out: Acceptable/Baseline resp. status   CV/Hemodynamics: Uneventful            Sign Out: Acceptable CV status; No obvious hypovolemia; No obvious fluid overload   Other NRE: NONE   DID A NON-ROUTINE EVENT OCCUR? No           Last vitals:  Vitals Value Taken Time   /77 10/28/21 0845   Temp 36.7  C (98  F) 10/28/21 0839   Pulse 75 10/28/21 0855   Resp 23 10/28/21 0855   SpO2 98 % 10/28/21 0855   Vitals shown include unvalidated device data.    Electronically Signed By: Yoon Collins MD  October 28, 2021  8:56 AM

## 2021-10-28 NOTE — LETTER
November 2, 2021      Kaylah Arreola  [address] [facility]  [place] [address]        Dear ,    The pathology results returned from the biopsies taken at your recent endoscopy.    The esophagus biopsies showed inflammation consistent with acid reflux.    There was no evidence of infection or malignancy.    I have passed on these results to Gena Celestin in the GI clinic who will decide the next steps in your care.    Sincerely,               Bjorn Blanco MD   [facility] [place], ENDOSCOPY  [address] [facility] [address]  Phone: [phone]

## 2021-10-29 LAB
PATH REPORT.COMMENTS IMP SPEC: NORMAL
PATH REPORT.FINAL DX SPEC: NORMAL
PATH REPORT.GROSS SPEC: NORMAL
PATH REPORT.MICROSCOPIC SPEC OTHER STN: NORMAL
PATH REPORT.RELEVANT HX SPEC: NORMAL
PHOTO IMAGE: NORMAL

## 2021-10-29 PROCEDURE — 88305 TISSUE EXAM BY PATHOLOGIST: CPT | Mod: 26 | Performed by: PATHOLOGY

## 2021-11-24 ASSESSMENT — ENCOUNTER SYMPTOMS
DIARRHEA: 1
RECTAL PAIN: 0
SMELL DISTURBANCE: 1
WEAKNESS: 1
FEVER: 0
DISTURBANCES IN COORDINATION: 1
INCREASED ENERGY: 1
NAUSEA: 1
STIFFNESS: 1
WEIGHT LOSS: 1
TASTE DISTURBANCE: 1
JOINT SWELLING: 1
LOSS OF CONSCIOUSNESS: 0
SPEECH CHANGE: 0
POLYDIPSIA: 0
DECREASED CONCENTRATION: 1
VOMITING: 1
JAUNDICE: 0
NECK MASS: 0
PANIC: 0
DECREASED APPETITE: 1
INSOMNIA: 1
SINUS CONGESTION: 0
BLOOD IN STOOL: 0
TROUBLE SWALLOWING: 0
NIGHT SWEATS: 0
SINUS PAIN: 0
TINGLING: 0
TREMORS: 1
POOR WOUND HEALING: 0
DIZZINESS: 1
SKIN CHANGES: 0
BLOATING: 1
BOWEL INCONTINENCE: 1
MYALGIAS: 1
ARTHRALGIAS: 1
SORE THROAT: 0
BRUISES/BLEEDS EASILY: 1
HOARSE VOICE: 0
HALLUCINATIONS: 0
ALTERED TEMPERATURE REGULATION: 1
MUSCLE WEAKNESS: 1
NECK PAIN: 1
MUSCLE CRAMPS: 1
MEMORY LOSS: 1
NERVOUS/ANXIOUS: 1
DEPRESSION: 1
HEARTBURN: 1
NUMBNESS: 1
CHILLS: 1
NAIL CHANGES: 0
HEADACHES: 1
ABDOMINAL PAIN: 1
CONSTIPATION: 0
FATIGUE: 1
BACK PAIN: 1
POLYPHAGIA: 1

## 2021-11-29 ENCOUNTER — VIRTUAL VISIT (OUTPATIENT)
Dept: GASTROENTEROLOGY | Facility: CLINIC | Age: 56
End: 2021-11-29
Payer: MEDICARE

## 2021-11-29 DIAGNOSIS — R11.2 NAUSEA AND VOMITING, INTRACTABILITY OF VOMITING NOT SPECIFIED, UNSPECIFIED VOMITING TYPE: Primary | ICD-10-CM

## 2021-11-29 DIAGNOSIS — K76.9 CHRONIC LIVER DISEASE: ICD-10-CM

## 2021-11-29 DIAGNOSIS — K76.0 FATTY LIVER: ICD-10-CM

## 2021-11-29 DIAGNOSIS — K21.00 GASTROESOPHAGEAL REFLUX DISEASE WITH ESOPHAGITIS WITHOUT HEMORRHAGE: ICD-10-CM

## 2021-11-29 PROCEDURE — 99215 OFFICE O/P EST HI 40 MIN: CPT | Mod: 95 | Performed by: PHYSICIAN ASSISTANT

## 2021-11-29 RX ORDER — ONDANSETRON 4 MG/1
4 TABLET, FILM COATED ORAL EVERY 6 HOURS PRN
Status: CANCELLED | OUTPATIENT
Start: 2021-11-29 | End: 2021-12-29

## 2021-11-29 RX ORDER — ONDANSETRON 4 MG/1
4 TABLET, ORALLY DISINTEGRATING ORAL EVERY 6 HOURS PRN
Qty: 120 TABLET | Refills: 0 | Status: SHIPPED | OUTPATIENT
Start: 2021-11-29

## 2021-11-29 RX ORDER — PANTOPRAZOLE SODIUM 40 MG/1
40 TABLET, DELAYED RELEASE ORAL
Qty: 180 TABLET | Refills: 0 | Status: SHIPPED | OUTPATIENT
Start: 2021-11-29 | End: 2022-02-22

## 2021-11-29 NOTE — PROGRESS NOTES
Kaylah is a 56 year old who is being evaluated via a billable video visit.      How would you like to obtain your AVS? MyChart  If the video visit is dropped, the invitation should be resent by: Send to e-mail at: [email or web address]  Will anyone else be joining your video visit? No

## 2021-11-29 NOTE — PROGRESS NOTES
GASTROENTEROLOGY FOLLOW UP VIDEO VISIT     Video Start Time: 12:05 PM    CC/REFERRING MD:    Zhang Roberto    REASON FOR VISIT: follow up         HISTORY OF PRESENT ILLNESS:    Kaylah Arreola is 56 year old female who presents for follow up.     She has hx of reflux esophagitis and barretts esophagus with increased eos on prior EGD from January 2021 which was treated with protonix and oral viscous budesonide. She however stopped these medications previously. We repeated her EGD recently due to ongoing symptoms of upper abdominal pain after eating and discovered la Grade A esophagitis with eos in the distal esophagus but not in mid esophagus. We restarted her on protonix once daily. She has a small hiatal hernia but otherwise her stomach and duodenum looked normal on egd.     Continues to have upper abd cramping after eating. N/v. Bloating. Feeling full. She is waking up every morning feeling nauseous. Certain smells induce vomiting. She is vomiting at least daily. She is in tears most days due to ongoing symptoms.     Other work up regarding her ongoing symptoms include an abd sono which noted a fatty liver but was otherwise unremarkable. Subsequent US elastography was suggestive of advanced chronic liver disease. She has recently been evaluated by hepatologist to review these findings. She has risk factors for NAFLD but low suspicion for cirrhosis. Liver also not thought to be related to her symptoms. Patient however disagrees with these findings and believes that her liver disease is more advanced and is causing her symptoms. She has made an appointment with an outside hepatologist for January to review and requests a repeat elastrography.     At one of prior visits we discussed further evaluation with HIDA and 4 hr GES however she is not able to complete nuclear medicine studies due to side effects.  she also had a recent CT abd/pelvis in October 2021 for ongoing abd pain which did not explain reasoning  behind her abd pain. We previously recommended a colonoscopy but she declined.     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    She  has a past surgical history that includes Hysterectomy; Electrophysiology Pacemaker; Esophagoscopy, gastroscopy, duodenoscopy (EGD), combined (01/07/2021); Esophagoscopy, gastroscopy, duodenoscopy (EGD), combined (N/A, 1/7/2021); and Esophagoscopy, gastroscopy, duodenoscopy (EGD), combined (N/A, 10/28/2021).    She  reports that she has quit smoking. She has never used smokeless tobacco. She reports previous alcohol use. She reports current drug use. Drug: Marijuana.    Her family history is not on file.    ALLERGIES:  Codeine, Hydrocodone-acetaminophen, Metronidazole, Midodrine, Phenytoin, Ranitidine, Clarithromycin, Metoclopramide, and Sulfa drugs      PREVIOUS ENDOSCOPY:    UPPER ENDOSCOPY 10/28/2021  Impression:   - LA Grade A reflux esophagitis.                        - Normal esophagus.                        - Normal stomach.                        - Small hiatal hernia.                        - Normal examined duodenum.                        - Several biopsies were obtained in the middle third of                        the esophagus and in the lower third of the esophagus.       A. DISTAL ESOPHAGUS, BIOPSY:  - Squamous mucosa with markedly increased intraepithelial eosinophils (up to 50 eosinophils per HPF)  - Negative for intestinal metaplasia, dysplasia and malignancy  - See comment    B. MID ESOPHAGUS, BIOPSY:  - Squamous mucosa with mild lymphocytic chronic inflammation; no eosinophils identified  - Negative for intestinal metaplasia, dysplasia and malignancy  - See comment      PERTINENT MEDICATIONS:    Current Outpatient Medications:      atorvastatin (LIPITOR) 20 MG tablet, Take 20 mg by mouth daily , Disp: , Rfl:      budesonide (PULMICORT) 0.5  MG/2ML neb solution, Mix 2 ampules with 10 packets of sucrose (splenda) (for each dose) and take this mixture twice daily for eosinophilic esophagitis. Do not eat or drink for 30 minutes after taking (Patient not taking: Reported on 10/13/2021), Disp: 240 mL, Rfl: 1     buPROPion (WELLBUTRIN SR) 200 MG 12 hr tablet, Take 1 tablet by mouth daily  (Patient not taking: Reported on 9/27/2021), Disp: , Rfl:      busPIRone (BUSPAR) 10 MG tablet, Take 10 mg by mouth daily , Disp: , Rfl:      COMPOUNDED NON-CONTROLLED SUBSTANCE (CMPD RX) - PHARMACY TO MIX COMPOUNDED MEDICATION, Viscous budesonide, swallow 1 mg twice daily.  Do not eat or drink for 30 minutes after taking. (Patient not taking: Reported on 10/13/2021), Disp: 60 mg, Rfl: 1     EMGALITY 120 MG/ML injection, Inject 120 mg Subcutaneous every 30 days, Disp: , Rfl:      escitalopram (LEXAPRO) 5 MG tablet, Take 5 mg by mouth daily, Disp: , Rfl:      hydroxychloroquine (PLAQUENIL) 200 MG tablet, TAKE 1 TABLET BY MOUTH TWICE A DAY, Disp: , Rfl: 1     hydrOXYzine (VISTARIL) 25 MG capsule, Take 1-2 capsules by mouth nightly as needed, Disp: , Rfl:      levothyroxine (SYNTHROID/LEVOTHROID) 112 MCG tablet, Take 112 mcg by mouth daily , Disp: , Rfl:      levothyroxine (SYNTHROID/LEVOTHROID) 125 MCG tablet, Take 125 mcg by mouth daily, Disp: , Rfl:      liothyronine (CYTOMEL) 5 MCG tablet, Take 5 mcg by mouth daily, Disp: , Rfl:      loratadine (CLARITIN) 10 MG tablet, Take 10 mg by mouth daily, Disp: , Rfl:      LORazepam (ATIVAN) 1 MG tablet, Take 1 mg by mouth Take 1 tablet during the day and Take 3 tabs at 8pm, Disp: , Rfl:      metoprolol succinate ER (TOPROL-XL) 25 MG 24 hr tablet, Take 12.5 mg by mouth 2 times daily , Disp: , Rfl: 3     nitroGLYcerin (NITROSTAT) 0.4 MG sublingual tablet, Place 0.4 mg under the tongue every 5 minutes as needed , Disp: , Rfl:      ondansetron (ZOFRAN) 4 MG tablet, Take 1 tablet (4 mg) by mouth every 8 hours as needed for nausea, Disp:  30 tablet, Rfl: 1     ondansetron (ZOFRAN-ODT) 4 MG ODT tab, Take 1 tablet by mouth 2 times daily as needed (Patient not taking: Reported on 10/13/2021), Disp: , Rfl:      pantoprazole (PROTONIX) 40 MG EC tablet, Take 1 tablet (40 mg) by mouth daily (Patient not taking: Reported on 10/13/2021), Disp: 90 tablet, Rfl: 0     sertraline (ZOLOFT) 100 MG tablet, Take 100 mg by mouth daily  (Patient not taking: Reported on 10/13/2021), Disp: , Rfl:      sertraline (ZOLOFT) 50 MG tablet, TAKE ONE TAB BY MOUTH ONCE DAILY. TAKE ALONG WITH 200MG FOR A TOTAL DAILY DOSE OF 250MG (Patient not taking: Reported on 10/13/2021), Disp: , Rfl: 3     tiZANidine (ZANAFLEX) 4 MG tablet, Take 4 mg by mouth 2 times daily , Disp: , Rfl:      topiramate (TOPAMAX) 100 MG tablet, Take 100 mg by mouth 2 times daily , Disp: , Rfl: 2     traMADol (ULTRAM) 50 MG tablet, Take 50 mg by mouth 2 times daily , Disp: , Rfl: 2     vitamin D3 (CHOLECALCIFEROL) 2000 units tablet, Take 1 tablet by mouth daily  (Patient not taking: Reported on 9/27/2021), Disp: , Rfl:         PHYSICAL EXAMINATION:  Constitutional: aaox3, cooperative, pleasant, not dyspneic/diaphoretic, no acute distress      GENERAL: Healthy, alert and no distress  EYES: Eyes grossly normal to inspection.  No discharge or erythema, or obvious scleral/conjunctival abnormalities.  RESP: No audible wheeze, cough, or visible cyanosis.  No visible retractions or increased work of breathing.    SKIN: Visible skin clear. No significant rash, abnormal pigmentation or lesions.  NEURO: Cranial nerves grossly intact.  Mentation and speech appropriate for age.  PSYCH: Mentation appears normal, affect normal/bright, judgement and insight intact, normal speech and appearance well-groomed.        RADIOLOGY:    EXAMINATION: CT ABDOMEN PELVIS W CONTRAST, 10/21/2021 3:52 PM     TECHNIQUE:  Helical CT images from the lung bases through the  symphysis pubis were obtained with IV contrast. Contrast dose:  Isovue  370 104mL     COMPARISON: Ultrasound 3/12/2019     HISTORY: Abdominal distension; Abdominal pain, generalized     FINDINGS:     Abdomen and pelvis: Focal fatty infiltration along the falciform  ligament. No suspicious focal hepatic lesion. Gallbladder, bile ducts,  pancreas, adrenal glands, and left kidney are unremarkable. Low-lying  right kidney with single ureter implanting normally in the urinary  bladder. Too  small to characterize hypodensity in the spleen may  represent hemangioma or cyst (series 3, image 72). Hysterectomy. No  dilated bowel. Appendectomy. No free fluid or free air. 1.2 cm portal  caval lymph node. Retroaortic left renal vein. Major abdominal vessels  are normal in caliber.     Lower thorax:     Lung bases are clear. No pleural effusion. Partially visualized pacer  lead in the right ventricle.     Bones and soft tissues: Well marginated 11 mm lucent lesion in the  right ischium(series 3, image 361), with no aggressive features  favored to be associated with degenerative changes in the adjacent  right hip joint.                                                                      IMPRESSION:      1. No acute finding to explain the patient's pain. No ascites or   high-grade bowel obstruction.   2. Congenital low-lying right kidney.     I have personally reviewed the examination and initial interpretation  and I agree with the findings.     NILESH DEL TORO MD          ASSESSMENT/PLAN:    1. Nausea and vomiting, intractability of vomiting not specified, unspecified vomiting type  - ondansetron (ZOFRAN-ODT) 4 MG ODT tab; Take 1 tablet (4 mg) by mouth every 6 hours as needed for nausea or vomiting  Dispense: 120 tablet; Refill: 0  - Nutrition Referral  2. Gastroesophageal reflux disease with esophagitis without hemorrhage  - pantoprazole (PROTONIX) 40 MG EC tablet; Take 1 tablet (40 mg) by mouth 2 times daily (before meals)  Dispense: 180 tablet; Refill: 0  - Nutrition Referral  3. Chronic  liver disease  - US Elastography Only; Future  - Nutrition Referral  4. Fatty liver  - US Elastography Only; Future  - Nutrition Referral      Kaylah Arreola is a 56 year old female who presents for follow up.     She has ongoing upper abdominal pain with nausea and vomiting. Recent work up includes ct abd/pelvis and EGD in October 2021. Ct was unrevealing. egd noted la grade A esophagitis and small hiatal hernia but was otherwise unrevealing. She was restarted on protonix. She continues to have symptoms however and also notes early satiety and continued n/v. Previously recommended HIDA scan and 4 hr GES however she was not able to complete due to concerns with nuclear medicine testing. She continues to decline testing. Reviewed work up thus far. Differential includes ongoing gerd vs. Gastroparesis vs cyclic vomiting syndrome. Recommended increasing protonix to twice daily at this time. Continue zofran as needed. Unable to trial reglan due to concerns with side effects/allergies. Recommended referral to nutritionist to review gastroparesis diet. Encouraged smaller, yet more frequent meals. Also recommended [facility] as I do think [facility] could be playing a significant role in her symptoms. Review of social history does note use of marijuana. Also possible that this could be contributing to her symptoms. If no improvement with above measures should consider trial off of marijuana.     In regards to fatty liver and chronic liver disease: her prior work up includes abd sono which noted fatty liver. Subsequent elastography suggested advanced liver disease. We referred her to a hepatologist to review further. She was suspected to have NAFLD but low suspicion for cirrhosis. She however does not agree with these findings and believes that her liver disease is more progressed than is being suggested and believes that she could have cirrhosis. She has scheduled an appointment with an outside hepatologist and requests a repeat  elastography at this time to compare to prior. Encouraged her to wait until she meets new hepatologist for their recommendations however she would much rather have repeat at this time. Orders placed. Advised further liver testing will need to be deferred to hepatology.         Return in about 2 months (around 1/29/2022) for Follow up, using a video visit.      Thank you for this consultation.  It was a pleasure to participate in the care of this patient; please contact us with any further questions.        This note was created with voice recognition software, and while reviewed for accuracy, typos may remain.       40 minutes spent on the date of the encounter doing chart review, history and exam, documentation and further activities per the note    Gena Celestin PA-C  Gastroenterology  [facility]       Video-Visit Details    Type of service:  Video Visit    Video End Time:12:30 PM    Originating Location (pt. Location): Home    Distant Location (provider location):  [facility]     Platform used for Video Visit: Renee

## 2021-11-29 NOTE — NURSING NOTE
Patient verified meds and allergies are correct via patients echeck-in.    Nery Smiley, Virtual Facilitator

## 2021-11-29 NOTE — PATIENT INSTRUCTIONS
It was a pleasure visiting with you today.     Take protonix (pantoprazole) twice daily. This is best taken on an empty stomach about 30 minutes before meals (so once before breakfast and once before dinner.     Continue zofran as needed for nausea.     Please call [phone] to schedule an appointment with our nutritionist to review dietary changes that may be helpful.       Please call [phone] to schedule an appointment with our clinical health psychologist Teresa Cox, PhD.       Please call [phone] to schedule your imaging scan (elastrography) at any capable convenient [facility] location.     Follow up in 2-3 months.     Gena Celestin PA-C  Gastroenterology  [facility] [place]

## 2021-12-01 ENCOUNTER — ANCILLARY PROCEDURE (OUTPATIENT)
Dept: ULTRASOUND IMAGING | Facility: CLINIC | Age: 56
End: 2021-12-01
Attending: PHYSICIAN ASSISTANT
Payer: MEDICARE

## 2021-12-01 DIAGNOSIS — K76.9 CHRONIC LIVER DISEASE: ICD-10-CM

## 2021-12-01 DIAGNOSIS — K76.0 FATTY LIVER: ICD-10-CM

## 2021-12-01 PROCEDURE — 91200 LIVER ELASTOGRAPHY: CPT | Performed by: RADIOLOGY

## 2021-12-07 ENCOUNTER — VIRTUAL VISIT (OUTPATIENT)
Dept: GASTROENTEROLOGY | Facility: CLINIC | Age: 56
End: 2021-12-07
Attending: PHYSICIAN ASSISTANT
Payer: MEDICARE

## 2021-12-07 DIAGNOSIS — K21.00 GASTROESOPHAGEAL REFLUX DISEASE WITH ESOPHAGITIS WITHOUT HEMORRHAGE: ICD-10-CM

## 2021-12-07 DIAGNOSIS — K76.9 CHRONIC LIVER DISEASE: ICD-10-CM

## 2021-12-07 DIAGNOSIS — K76.0 FATTY LIVER: ICD-10-CM

## 2021-12-07 DIAGNOSIS — R10.13 ABDOMINAL PAIN, EPIGASTRIC: ICD-10-CM

## 2021-12-07 DIAGNOSIS — R63.4 UNINTENDED WEIGHT LOSS: ICD-10-CM

## 2021-12-07 DIAGNOSIS — R11.2 NAUSEA AND VOMITING, INTRACTABILITY OF VOMITING NOT SPECIFIED, UNSPECIFIED VOMITING TYPE: ICD-10-CM

## 2021-12-07 PROCEDURE — 97802 MEDICAL NUTRITION INDIV IN: CPT | Mod: GZ | Performed by: DIETITIAN, REGISTERED

## 2021-12-07 NOTE — LETTER
12/7/2021         RE: Kaylah Arreola  [address]  [address]  [facility] [place] [address]        Dear Colleague,    Thank you for referring your patient, Kaylah Arreola, to the [facility] [place]. Please see a copy of my visit note below.    Kaylah is a 56 year old who is being evaluated via a billable video visit.      How would you like to obtain your AVS? MyChart  If the video visit is dropped, the invitation should be resent by: Text to cell phone: [phone]  Will anyone else be joining your video visit? No    Video Start Time: 10:33 AM  Video-Visit Details    Type of service:  Video Visit    Video End Time:11:29 AM    Originating Location (pt. Location): Home    Distant Location (provider location):  [facility] [place]     Platform used for Video Visit: [employer]     [facility] Outpatient Medical Nutrition Therapy      Time Spent:  56 minutes  Session Type:  Initial   Referring Physician:  Gena Celestin PA-C  Reason for RD Visit:   GERD with esophagitis, Constantino's esophagus and fatty liver    Nutrition Assessment:  Patient is a 56 year old female with history that includes gastroesophageal reflux with esophagitis, Constantino's esophagus, fatty liver.    She stated that she can't eat a lot and has lost 70 lbs over the past year due to not being able to eat much. Some days she can eats a little bit and other days can't eat much and may only drink liquids. Some days she get terrible stomach cramps and has to curl up in a ball due to pain. Some days she can eat a couple of small meals (about 5 bites at each meal). Previously, she was able to eat jello and pudding, but now the smell of sweets are making her throw up as well the textures of these foods are no longer tolerated. Certain smells in general cause her to have vomiting as well. So from day to day her intake can vary widely. She may also have pain and vomiting even when she does not eat.  "About 3-4 weeks ago, she reported that she went to the ED due to severe pain. She was given percocet, and now when takes percocet and zofran on a day, then she is able to eat some smaller meals and also this combination of medications prevent her from getting stomach cramps after eating. She reported having constant diarrhea which she has had issues with for years. Over the last month, she started to wear pull ups/depends if going out due to now not being able to control stools any longer. Denies any blood or darkness to stools. She can tolerate liquids such as water, vanilla ensure drinks only and 7-up. The 7-up is well tolerated. sometimes she lets it sit for a while so not as carbonated. She cannot tolerate any other flavors of ensure drinks. She does not drinks these daily as they are expensive. She can also tolerate a few cheese and crackers in the morning so tends to have this as a small meal in the days/1st meal of the day. She can also tolerates lo mein noodles in small amounts once in a while and can tolerate some chicken wings but does not tolerate chicken breast. If eats something that does not agree with her, then within 5-10 minutes, then has severe cramping but will also will get this cramping without eating. Afternoons tend to be the worst with her pain for arthritis and fibromyalgia. She bought a wedge for her bed so she is sleeping a little more upright to help with overnight reflux. She stated that she is meeting with another liver specialist in January 2022.    Height:   Ht Readings from Last 1 Encounters:   10/28/21 1.549 m (5' 1\")     Weight: reported her weight was about 225 lbs about a year/ year and a half ago and now 160 lbs. Weight loss started gradually and then started to rapid drop.    Wt Readings from Last 10 Encounters:   10/28/21 76.8 kg (169 lb 5 oz)   10/13/21 77.4 kg (170 lb 9.6 oz)   05/22/19 97.2 kg (214 lb 4.8 oz)   03/05/19 97.1 kg (214 lb)      BMI: Estimated body mass index " "is 31.99 kg/m  as calculated from the following:    Height as of 10/28/21: 1.549 m (5' 1\").    Weight as of 10/28/21: 76.8 kg (169 lb 5 oz).    Diet Recall:  (some usual/recent meals/snacks/beverages):  Meal Food    Breakfast Later morning/afternoon: Typically has some 4-5 butter crackers and small amount of thin sliced cheese on each cracker after getting up (which may be later morning or depending on when she gets up) or today tried 1/2 muffin (and okay) OR some days skips altogether   Lunch Skips   Dinner Last night: 1/2 c [employer] potato Soup and 1/2 sl toast OR once in a while 2 chicken wings OR sometimes a small amount of lo mein noodles OR just 1/2 banana OR Skips   Snacks Sometimes in the afternoon: 3-4 crackers and some thin sliced cheese. Recently tried a few slices of apples   Beverages 32-48 oz Water, 32-48 oz 7up, 2 cups hot herbal tea in am and 1 cup hot herbal tea at night. Occasional vanilla ensure about 1-2 x/week. Some days sips on a cup of bone broth (on a day when unable to eat much else).   Alcohol Intake None      Labs:    Last Comprehensive Metabolic Panel:  Sodium   Date Value Ref Range Status   03/05/2019 141 133 - 144 mmol/L Final     Potassium   Date Value Ref Range Status   03/05/2019 4.4 3.4 - 5.3 mmol/L Final     Chloride   Date Value Ref Range Status   03/05/2019 110 (H) 94 - 109 mmol/L Final     Carbon Dioxide   Date Value Ref Range Status   03/05/2019 24 20 - 32 mmol/L Final     Anion Gap   Date Value Ref Range Status   03/05/2019 7 3 - 14 mmol/L Final     Glucose   Date Value Ref Range Status   03/05/2019 123 (H) 70 - 99 mg/dL Final     Comment:     Non Fasting     Urea Nitrogen   Date Value Ref Range Status   03/05/2019 17 7 - 30 mg/dL Final     Creatinine   Date Value Ref Range Status   03/05/2019 0.94 0.52 - 1.04 mg/dL Final     GFR Estimate   Date Value Ref Range Status   03/05/2019 68 >60 mL/min/[1.73_m2] Final     Comment:     Non  GFR Calc  Starting 12/18/2018, " serum creatinine based estimated GFR (eGFR) will be   calculated using the Chronic Kidney Disease Epidemiology Collaboration   (CKD-EPI) equation.       Calcium   Date Value Ref Range Status   2019 9.3 8.5 - 10.1 mg/dL Final     CBC RESULTS:   Recent Labs   Lab Test 19  1514   WBC 15.1*   RBC 4.92   HGB 14.5   HCT 45.1   MCV 92   MCH 29.5   MCHC 32.2   RDW 13.6          Pertinent Medications/vitamin and mineral supplements:   Reported taking percocet and zofran   Current Outpatient Medications   Medication     atorvastatin (LIPITOR) 20 MG tablet     budesonide (PULMICORT) 0.5 MG/2ML neb solution     buPROPion (WELLBUTRIN SR) 200 MG 12 hr tablet     busPIRone (BUSPAR) 10 MG tablet     COMPOUNDED NON-CONTROLLED SUBSTANCE (CMPD RX) - PHARMACY TO MIX COMPOUNDED MEDICATION     EMGALITY 120 MG/ML injection     escitalopram (LEXAPRO) 5 MG tablet     hydroxychloroquine (PLAQUENIL) 200 MG tablet     hydrOXYzine (VISTARIL) 25 MG capsule     levothyroxine (SYNTHROID/LEVOTHROID) 112 MCG tablet     levothyroxine (SYNTHROID/LEVOTHROID) 125 MCG tablet     liothyronine (CYTOMEL) 5 MCG tablet     loratadine (CLARITIN) 10 MG tablet     LORazepam (ATIVAN) 1 MG tablet     metoprolol succinate ER (TOPROL-XL) 25 MG 24 hr tablet     nitroGLYcerin (NITROSTAT) 0.4 MG sublingual tablet     ondansetron (ZOFRAN) 4 MG tablet     ondansetron (ZOFRAN-ODT) 4 MG ODT tab     ondansetron (ZOFRAN-ODT) 4 MG ODT tab     pantoprazole (PROTONIX) 40 MG EC tablet     sertraline (ZOLOFT) 100 MG tablet     sertraline (ZOLOFT) 50 MG tablet     tiZANidine (ZANAFLEX) 4 MG tablet     topiramate (TOPAMAX) 100 MG tablet     traMADol (ULTRAM) 50 MG tablet     vitamin D3 (CHOLECALCIFEROL) 2000 units tablet     No current facility-administered medications for this visit.     Food Allergies:  NKFA     Estimated Nutrition Needs based on current body weight of 77 k-1925 calories (25-30 kcals/kg), 77-92g protein (1-1.2g/kg), ~1 ml/kcal or total  fluids per MD.     MALNUTRITION:  % Weight Loss:  > 5% in 1 month (severe malnutrition)  % Intake:  </= 75% for >/= 1 month (severe malnutrition)  Subcutaneous Fat Loss:  None observed  Muscle Loss:  None observed  Fluid Retention:  None noted    Malnutrition Diagnosis: Severe malnutrition  In Context of:  Chronic illness or disease    Nutrition Diagnosis:    Inadequate oral intake related to nausea, vomiting, abdominal pain/cramping as evidenced by pt report, diet recall and ~6 % weight loss over the past month (and 28% loss over the past year to year and a half).    Nutrition Prescription: smaller more frequent meals, protein drinks    Nutrition Intervention:    Nutrition Education/Counseling:  Provided diet education and tips and suggestions for increasing calories and protein in seeing of nausea, vomiting, GERD with esophagitis, abdominal pain and liver disease. Explained increased protein needs with liver disease. Explained if having issues with ascites, lower sodium recommended. Since she denies any issues with ascites and has limited food tolerances at this time, did not stress or discuss low sodium diet at this time. At this time encouraged her to try to increase intake over all with good protein intake as well. Discussed adequate fluids intake and recommended incorporating some electrolyte drinks in addition to water and herbal tea. Explained that carbonated beverages may contribute to abdominal pain and gerd, but since pt tolerating well and it's providing more calories, then okay to continue in addition to other fluids and electrolyte drinks.      Patient verbalized understanding of education provided. See Goals below.     Goals:  1. Aim for eating 4-6 smaller meals spread out throughout the day. (even if you can only tolerate a few bites at a meal, that's okay. Goal is that you will eat small amounts more frequently as this may be better tolerated and allow you to eat more.    2. Drink at least 1-2 or  more nutrition drinks per day (such as your vanilla Ensure drink and/or you can make your own drink at home using the Orgain Vegan protein powder or other plant-based protein powder). Since you are lactose intolerant, I'd recommend choose the Plant-based protein powders to make your own drinks especially since you have tried these and they are tolerated.    3. On days when having a lot of nausea, then can try some clear liquid protein drinks such as Protein 2o, Premier Clear or the powdered Isopure drink (that you mix with water or other fluids).     --Also on days, when unable to eat much or anything, then recommend drinking multiple protein drinks along with other fluids.    4. Continue drinking at least 64 oz or more fluids per day (especially water, herbal tea and electrolyte drinks).    5. Recommend drinking at least 2 cups (16 oz) or more of an electrolyte type drink such as pedialyte, gatorade, powderade. There are also powdered electrolyte drinks that you can add to water or other fluids. If these drinks are too sweet, then can look for unsweetened pedialyte or unsweetened generic pedialyte type electrolyte drink.    6. Wait at least 2-3 hours after eating before lying down and going to sleep to prevent any issues with reflux overnight.    Nutrition Monitoring and Evaluation: Will monitor adherence to nutrition recommendations at future RD visits.     Further Medical Nutrition Therapy:  Follow-up 1/11/22 at 10:30 AM    Patient was encouraged to call/contact RD with any further questions.    Elisa Sharma, MS, RD, LD

## 2021-12-07 NOTE — PROGRESS NOTES
Kaylah is a 56 year old who is being evaluated via a billable video visit.      How would you like to obtain your AVS? MyChart  If the video visit is dropped, the invitation should be resent by: Text to cell phone: [phone]  Will anyone else be joining your video visit? No    Video Start Time: 10:33 AM  Video-Visit Details    Type of service:  Video Visit    Video End Time:11:29 AM    Originating Location (pt. Location): Home    Distant Location (provider location):  [facility] [place]     Platform used for Video Visit: Well     [facility] Outpatient Medical Nutrition Therapy      Time Spent:  56 minutes  Session Type:  Initial   Referring Physician:  Gena Celestin PA-C  Reason for RD Visit:   GERD with esophagitis, Constantino's esophagus and fatty liver    Nutrition Assessment:  Patient is a 56 year old female with history that includes gastroesophageal reflux with esophagitis, Constantino's esophagus, fatty liver.    She stated that she can't eat a lot and has lost 70 lbs over the past year due to not being able to eat much. Some days she can eats a little bit and other days can't eat much and may only drink liquids. Some days she get terrible stomach cramps and has to curl up in a ball due to pain. Some days she can eat a couple of small meals (about 5 bites at each meal). Previously, she was able to eat jello and pudding, but now the smell of sweets are making her throw up as well the textures of these foods are no longer tolerated. Certain smells in general cause her to have vomiting as well. So from day to day her intake can vary widely. She may also have pain and vomiting even when she does not eat. About 3-4 weeks ago, she reported that she went to the ED due to severe pain. She was given percocet, and now when takes percocet and zofran on a day, then she is able to eat some smaller meals and also this combination of medications prevent her from getting stomach cramps  "after eating. She reported having constant diarrhea which she has had issues with for years. Over the last month, she started to wear pull ups/depends if going out due to now not being able to control stools any longer. Denies any blood or darkness to stools. She can tolerate liquids such as water, vanilla ensure drinks only and 7-up. The 7-up is well tolerated. sometimes she lets it sit for a while so not as carbonated. She cannot tolerate any other flavors of ensure drinks. She does not drinks these daily as they are expensive. She can also tolerate a few cheese and crackers in the morning so tends to have this as a small meal in the days/1st meal of the day. She can also tolerates lo mein noodles in small amounts once in a while and can tolerate some chicken wings but does not tolerate chicken breast. If eats something that does not agree with her, then within 5-10 minutes, then has severe cramping but will also will get this cramping without eating. Afternoons tend to be the worst with her pain for arthritis and fibromyalgia. She bought a wedge for her bed so she is sleeping a little more upright to help with overnight reflux. She stated that she is meeting with another liver specialist in January 2022.    Height:   Ht Readings from Last 1 Encounters:   10/28/21 1.549 m (5' 1\")     Weight: reported her weight was about 225 lbs about a year/ year and a half ago and now 160 lbs. Weight loss started gradually and then started to rapid drop.    Wt Readings from Last 10 Encounters:   10/28/21 76.8 kg (169 lb 5 oz)   10/13/21 77.4 kg (170 lb 9.6 oz)   05/22/19 97.2 kg (214 lb 4.8 oz)   03/05/19 97.1 kg (214 lb)      BMI: Estimated body mass index is 31.99 kg/m  as calculated from the following:    Height as of 10/28/21: 1.549 m (5' 1\").    Weight as of 10/28/21: 76.8 kg (169 lb 5 oz).    Diet Recall:  (some usual/recent meals/snacks/beverages):  Meal Food    Breakfast Later morning/afternoon: Typically has some 4-5 " butter crackers and small amount of thin sliced cheese on each cracker after getting up (which may be later morning or depending on when she gets up) or today tried 1/2 muffin (and okay) OR some days skips altogether   Lunch Skips   Dinner Last night: 1/2 c [employer] potato Soup and 1/2 sl toast OR once in a while 2 chicken wings OR sometimes a small amount of lo mein noodles OR just 1/2 banana OR Skips   Snacks Sometimes in the afternoon: 3-4 crackers and some thin sliced cheese. Recently tried a few slices of apples   Beverages 32-48 oz Water, 32-48 oz 7up, 2 cups hot herbal tea in am and 1 cup hot herbal tea at night. Occasional vanilla ensure about 1-2 x/week. Some days sips on a cup of bone broth (on a day when unable to eat much else).   Alcohol Intake None      Labs:    Last Comprehensive Metabolic Panel:  Sodium   Date Value Ref Range Status   03/05/2019 141 133 - 144 mmol/L Final     Potassium   Date Value Ref Range Status   03/05/2019 4.4 3.4 - 5.3 mmol/L Final     Chloride   Date Value Ref Range Status   03/05/2019 110 (H) 94 - 109 mmol/L Final     Carbon Dioxide   Date Value Ref Range Status   03/05/2019 24 20 - 32 mmol/L Final     Anion Gap   Date Value Ref Range Status   03/05/2019 7 3 - 14 mmol/L Final     Glucose   Date Value Ref Range Status   03/05/2019 123 (H) 70 - 99 mg/dL Final     Comment:     Non Fasting     Urea Nitrogen   Date Value Ref Range Status   03/05/2019 17 7 - 30 mg/dL Final     Creatinine   Date Value Ref Range Status   03/05/2019 0.94 0.52 - 1.04 mg/dL Final     GFR Estimate   Date Value Ref Range Status   03/05/2019 68 >60 mL/min/[1.73_m2] Final     Comment:     Non  GFR Calc  Starting 12/18/2018, serum creatinine based estimated GFR (eGFR) will be   calculated using the Chronic Kidney Disease Epidemiology Collaboration   (CKD-EPI) equation.       Calcium   Date Value Ref Range Status   03/05/2019 9.3 8.5 - 10.1 mg/dL Final     CBC RESULTS:   Recent Labs   Lab Test  19  1514   WBC 15.1*   RBC 4.92   HGB 14.5   HCT 45.1   MCV 92   MCH 29.5   MCHC 32.2   RDW 13.6          Pertinent Medications/vitamin and mineral supplements:   Reported taking percocet and zofran   Current Outpatient Medications   Medication     atorvastatin (LIPITOR) 20 MG tablet     budesonide (PULMICORT) 0.5 MG/2ML neb solution     buPROPion (WELLBUTRIN SR) 200 MG 12 hr tablet     busPIRone (BUSPAR) 10 MG tablet     COMPOUNDED NON-CONTROLLED SUBSTANCE (CMPD RX) - PHARMACY TO MIX COMPOUNDED MEDICATION     EMGALITY 120 MG/ML injection     escitalopram (LEXAPRO) 5 MG tablet     hydroxychloroquine (PLAQUENIL) 200 MG tablet     hydrOXYzine (VISTARIL) 25 MG capsule     levothyroxine (SYNTHROID/LEVOTHROID) 112 MCG tablet     levothyroxine (SYNTHROID/LEVOTHROID) 125 MCG tablet     liothyronine (CYTOMEL) 5 MCG tablet     loratadine (CLARITIN) 10 MG tablet     LORazepam (ATIVAN) 1 MG tablet     metoprolol succinate ER (TOPROL-XL) 25 MG 24 hr tablet     nitroGLYcerin (NITROSTAT) 0.4 MG sublingual tablet     ondansetron (ZOFRAN) 4 MG tablet     ondansetron (ZOFRAN-ODT) 4 MG ODT tab     ondansetron (ZOFRAN-ODT) 4 MG ODT tab     pantoprazole (PROTONIX) 40 MG EC tablet     sertraline (ZOLOFT) 100 MG tablet     sertraline (ZOLOFT) 50 MG tablet     tiZANidine (ZANAFLEX) 4 MG tablet     topiramate (TOPAMAX) 100 MG tablet     traMADol (ULTRAM) 50 MG tablet     vitamin D3 (CHOLECALCIFEROL) 2000 units tablet     No current facility-administered medications for this visit.     Food Allergies:  NKFA     Estimated Nutrition Needs based on current body weight of 77 k-1925 calories (25-30 kcals/kg), 77-92g protein (1-1.2g/kg), ~1 ml/kcal or total fluids per MD.     MALNUTRITION:  % Weight Loss:  > 5% in 1 month (severe malnutrition)  % Intake:  </= 75% for >/= 1 month (severe malnutrition)  Subcutaneous Fat Loss:  None observed  Muscle Loss:  None observed  Fluid Retention:  None noted    Malnutrition Diagnosis:  Severe malnutrition  In Context of:  Chronic illness or disease    Nutrition Diagnosis:    Inadequate oral intake related to nausea, vomiting, abdominal pain/cramping as evidenced by pt report, diet recall and ~6 % weight loss over the past month (and 28% loss over the past year to year and a half).    Nutrition Prescription: smaller more frequent meals, protein drinks    Nutrition Intervention:    Nutrition Education/Counseling:  Provided diet education and tips and suggestions for increasing calories and protein in seeing of nausea, vomiting, GERD with esophagitis, abdominal pain and liver disease. Explained increased protein needs with liver disease. Explained if having issues with ascites, lower sodium recommended. Since she denies any issues with ascites and has limited food tolerances at this time, did not stress or discuss low sodium diet at this time. At this time encouraged her to try to increase intake over all with good protein intake as well. Discussed adequate fluids intake and recommended incorporating some electrolyte drinks in addition to water and herbal tea. Explained that carbonated beverages may contribute to abdominal pain and gerd, but since pt tolerating well and it's providing more calories, then okay to continue in addition to other fluids and electrolyte drinks.      Patient verbalized understanding of education provided. See Goals below.     Goals:  1. Aim for eating 4-6 smaller meals spread out throughout the day. (even if you can only tolerate a few bites at a meal, that's okay. Goal is that you will eat small amounts more frequently as this may be better tolerated and allow you to eat more.    2. Drink at least 1-2 or more nutrition drinks per day (such as your vanilla Ensure drink and/or you can make your own drink at home using the Orgain Vegan protein powder or other plant-based protein powder). Since you are lactose intolerant, I'd recommend choose the Plant-based protein powders to  make your own drinks especially since you have tried these and they are tolerated.    3. On days when having a lot of nausea, then can try some clear liquid protein drinks such as Protein 2o, Premier Clear or the powdered Isopure drink (that you mix with water or other fluids).     --Also on days, when unable to eat much or anything, then recommend drinking multiple protein drinks along with other fluids.    4. Continue drinking at least 64 oz or more fluids per day (especially water, herbal tea and electrolyte drinks).    5. Recommend drinking at least 2 cups (16 oz) or more of an electrolyte type drink such as pedialyte, gatorade, powderade. There are also powdered electrolyte drinks that you can add to water or other fluids. If these drinks are too sweet, then can look for unsweetened pedialyte or unsweetened generic pedialyte type electrolyte drink.    6. Wait at least 2-3 hours after eating before lying down and going to sleep to prevent any issues with reflux overnight.    Nutrition Monitoring and Evaluation: Will monitor adherence to nutrition recommendations at future RD visits.     Further Medical Nutrition Therapy:  Follow-up 1/11/22 at 10:30 AM    Patient was encouraged to call/contact RD with any further questions.    Elisa Sharma, MS, RD, LD

## 2021-12-08 NOTE — PATIENT INSTRUCTIONS
It was nice meeting you today. Below are the nutrition recommendations we discussed at your visit.    Please let me know if you have any additional questions.    Nutrition Recommendations    1. Aim for eating 4-6 smaller meals spread out throughout the day. (even if you can only tolerate a few bites at a meal, that's okay. Goal is that you will eat small amounts more frequently as this may be better tolerated and allow you to eat more.    2. Drink at least 1-2 or more nutrition drinks per day (such as your vanilla Ensure drink and/or you can make your own drink at home using the Orgain Vegan protein powder or other plant-based protein powder). Since you are lactose intolerant, I'd recommend choose the Plant-based protein powders to make your own drinks especially since you have tried these and they are tolerated.    3. On days when having a lot of nausea, then can try some clear liquid protein drinks such as Protein 2o, Premier Clear or the powdered Isopure drink (that you mix with water or other fluids).     --Also on days, when unable to eat much or anything, then recommend drinking multiple protein drinks along with other fluids.    4. Continue drinking at least 64 oz or more fluids per day (especially water, herbal tea and electrolyte drinks).    5. Recommend drinking at least 2 cups (16 oz) or more of an electrolyte type drink such as pedialyte, gatorade, powderade. There are also powdered electrolyte drinks that you can add to water or other fluids. If these drinks are too sweet, then can look for unsweetened pedialyte or unsweetened generic pedialyte type electrolyte drink.    6. Wait at least 2-3 hours after eating before lying down and going to sleep to prevent any issues with reflux overnight.    Your follow up appointment is scheduled for January 11, 2022 at 10:30 AM. If you need to make any changes to your appointment, please call [phone].    Elisa Sharma, MS, RD, LD

## 2022-01-05 ENCOUNTER — DOCUMENTATION ONLY (OUTPATIENT)
Dept: OTHER | Facility: CLINIC | Age: 57
End: 2022-01-05
Payer: MEDICARE

## 2022-01-24 ENCOUNTER — VIRTUAL VISIT (OUTPATIENT)
Dept: GASTROENTEROLOGY | Facility: CLINIC | Age: 57
End: 2022-01-24
Payer: MEDICARE

## 2022-01-24 DIAGNOSIS — R11.2 NAUSEA AND VOMITING, INTRACTABILITY OF VOMITING NOT SPECIFIED, UNSPECIFIED VOMITING TYPE: ICD-10-CM

## 2022-01-24 DIAGNOSIS — K76.0 FATTY LIVER: ICD-10-CM

## 2022-01-24 DIAGNOSIS — K21.00 GASTROESOPHAGEAL REFLUX DISEASE WITH ESOPHAGITIS WITHOUT HEMORRHAGE: Primary | ICD-10-CM

## 2022-01-24 PROCEDURE — 99442 PR PHYSICIAN TELEPHONE EVALUATION 11-20 MIN: CPT | Mod: 95 | Performed by: PHYSICIAN ASSISTANT

## 2022-01-24 ASSESSMENT — ENCOUNTER SYMPTOMS
JOINT SWELLING: 1
NAIL CHANGES: 0
NECK PAIN: 1
SMELL DISTURBANCE: 0
HYPOTENSION: 0
PALPITATIONS: 1
VOMITING: 1
SNORES LOUDLY: 0
BRUISES/BLEEDS EASILY: 1
MUSCLE WEAKNESS: 1
POLYPHAGIA: 0
HYPERTENSION: 1
DIFFICULTY URINATING: 0
HEMATURIA: 0
COUGH DISTURBING SLEEP: 1
INSOMNIA: 1
COUGH: 1
WEIGHT LOSS: 1
DISTURBANCES IN COORDINATION: 0
PANIC: 1
BACK PAIN: 1
ABDOMINAL PAIN: 1
POLYDIPSIA: 1
DIARRHEA: 1
BOWEL INCONTINENCE: 1
LIGHT-HEADEDNESS: 1
NECK MASS: 0
BLOATING: 0
SINUS PAIN: 1
PARALYSIS: 0
SINUS CONGESTION: 1
DECREASED APPETITE: 0
CHILLS: 1
SEIZURES: 0
HALLUCINATIONS: 0
FEVER: 1
RECTAL PAIN: 0
BLOOD IN STOOL: 0
HEADACHES: 1
HEMOPTYSIS: 0
SORE THROAT: 1
INCREASED ENERGY: 1
MUSCLE CRAMPS: 1
DECREASED CONCENTRATION: 1
NERVOUS/ANXIOUS: 1
WHEEZING: 0
WEAKNESS: 1
SHORTNESS OF BREATH: 1
NUMBNESS: 1
SWOLLEN GLANDS: 1
SPUTUM PRODUCTION: 1
TINGLING: 0
LEG PAIN: 0
LOSS OF CONSCIOUSNESS: 0
FLANK PAIN: 1
JAUNDICE: 0
TREMORS: 1
POSTURAL DYSPNEA: 1
ARTHRALGIAS: 1
DEPRESSION: 1
WEIGHT GAIN: 0
FATIGUE: 1
SYNCOPE: 0
POOR WOUND HEALING: 0
CONSTIPATION: 0
EXERCISE INTOLERANCE: 1
ORTHOPNEA: 1
TROUBLE SWALLOWING: 1
DYSPNEA ON EXERTION: 1
HOARSE VOICE: 1
NAUSEA: 1
HEARTBURN: 1
DYSURIA: 0
DIZZINESS: 1
SLEEP DISTURBANCES DUE TO BREATHING: 0
NIGHT SWEATS: 0
STIFFNESS: 1
ALTERED TEMPERATURE REGULATION: 1
MYALGIAS: 1
SKIN CHANGES: 0
MEMORY LOSS: 1
TASTE DISTURBANCE: 0
SPEECH CHANGE: 0

## 2022-01-24 NOTE — PROGRESS NOTES
Kaylah is a 56 year old who is being evaluated via a billable video visit.      How would you like to obtain your AVS? MyChart  If the video visit is dropped, the invitation should be resent by: Text to cell phone: [phone]  Will anyone else be joining your video visit? No          GASTROENTEROLOGY FOLLOW UP VIRTUAL VISIT     attempted video visit but due to technically difficulties with video/audio switched to telephone visit*    CC/REFERRING MD:    Zhang Roberto      REASON FOR VISIT: Video Visit      HISTORY OF PRESENT ILLNESS:    Kaylah Arreola is 56 year old female who presents for follow up.     She has hx of reflux esophagitis and barretts esophagus with increased eos on prior EGD from January 2021 which was treated with protonix and oral viscous budesonide. She however stopped these medications previously. We repeated her EGD in October 2021 due to ongoing symptoms of upper abdominal pain after eating and discovered LA Grade A esophagitis with eos in the distal esophagus but not in mid esophagus. We restarted her on protonix once daily. She has a small hiatal hernia but otherwise her stomach and duodenum looked normal on egd. She continued to have upper abd cramping after eating. N/v. Bloating. Feeling full. Other work up regarding her ongoing symptoms include an abd sono which noted a fatty liver but was otherwise unremarkable. We also recommended a HIDA scan and 4 hr GES however she was not able to complete nuclear medicine studies due to side effects. We previously recommended a colonoscopy as well but she declined.  She also had a recent CT abd/pelvis in October 2021 for ongoing abd pain which did not explain reasoning behind her abd pain. We advised her to trial double dose of protonix and to continue zofran as needed. Since our last visit she developed COVID and was very ill with worsening GI symptoms as well. She is currently recovering and starting to feel better but does continue to have  residual symptoms. She is being followed by primary care. She is restarting her regular medications now so is returning to protonix daily at this time.       In regards to the fatty liver she was subsequently evaluated with an US elastography in May 2021 which was suggestive of advanced chronic liver disease. She has recently been evaluated by hepatologist to review these findings. She has risk factors for NAFLD but low suspicion for cirrhosis. Liver also not thought to be related to her symptoms. Patient however disagrees with these findings and believes that her liver disease is more advanced and is causing her symptoms. She requested an updated elastrography which was completed in December 2021 which actually read as a normal elastography. She still has an upcoming appointment with an outside hepatologist to review finding. This was originally scheduled for this month but had to be rescheduled to March.       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    She  has a past surgical history that includes Hysterectomy; Electrophysiology Pacemaker; Esophagoscopy, gastroscopy, duodenoscopy (EGD), combined (01/07/2021); Esophagoscopy, gastroscopy, duodenoscopy (EGD), combined (N/A, 1/7/2021); and Esophagoscopy, gastroscopy, duodenoscopy (EGD), combined (N/A, 10/28/2021).    She  reports that she has quit smoking. She has never used smokeless tobacco. She reports previous alcohol use. She reports current drug use. Drug: Marijuana.    Her family history is not on file.    ALLERGIES:  Codeine, Hydrocodone-acetaminophen, Metronidazole, Midodrine, Phenytoin, Ranitidine, Clarithromycin, Metoclopramide, and Sulfa drugs      PREVIOUS ENDOSCOPY:    UPPER ENDOSCOPY 10/28/2021  Impression:   - LA Grade A reflux esophagitis.                        - Normal esophagus.                        - Normal stomach.                         - Small hiatal hernia.                        - Normal examined duodenum.                        - Several biopsies were obtained in the middle third of the esophagus and in the lower third of the esophagus.       A. DISTAL ESOPHAGUS, BIOPSY:  - Squamous mucosa with markedly increased intraepithelial eosinophils (up to 50 eosinophils per HPF)  - Negative for intestinal metaplasia, dysplasia and malignancy  - See comment    B. MID ESOPHAGUS, BIOPSY:  - Squamous mucosa with mild lymphocytic chronic inflammation; no eosinophils identified  - Negative for intestinal metaplasia, dysplasia and malignancy  - See comment         PERTINENT MEDICATIONS:    Current Outpatient Medications:      atorvastatin (LIPITOR) 20 MG tablet, Take 20 mg by mouth daily , Disp: , Rfl:      budesonide (PULMICORT) 0.5 MG/2ML neb solution, Mix 2 ampules with 10 packets of sucrose (splenda) (for each dose) and take this mixture twice daily for eosinophilic esophagitis. Do not eat or drink for 30 minutes after taking (Patient not taking: Reported on 10/13/2021), Disp: 240 mL, Rfl: 1     buPROPion (WELLBUTRIN SR) 200 MG 12 hr tablet, Take 1 tablet by mouth daily  (Patient not taking: Reported on 9/27/2021), Disp: , Rfl:      busPIRone (BUSPAR) 10 MG tablet, Take 10 mg by mouth daily , Disp: , Rfl:      COMPOUNDED NON-CONTROLLED SUBSTANCE (CMPD RX) - PHARMACY TO MIX COMPOUNDED MEDICATION, Viscous budesonide, swallow 1 mg twice daily.  Do not eat or drink for 30 minutes after taking. (Patient not taking: Reported on 10/13/2021), Disp: 60 mg, Rfl: 1     EMGALITY 120 MG/ML injection, Inject 120 mg Subcutaneous every 30 days, Disp: , Rfl:      escitalopram (LEXAPRO) 5 MG tablet, Take 5 mg by mouth daily, Disp: , Rfl:      hydroxychloroquine (PLAQUENIL) 200 MG tablet, TAKE 1 TABLET BY MOUTH TWICE A DAY, Disp: , Rfl: 1     hydrOXYzine (VISTARIL) 25 MG capsule, Take 1-2 capsules by mouth nightly as needed, Disp: , Rfl:      levothyroxine  (SYNTHROID/LEVOTHROID) 112 MCG tablet, Take 112 mcg by mouth daily , Disp: , Rfl:      levothyroxine (SYNTHROID/LEVOTHROID) 125 MCG tablet, Take 125 mcg by mouth daily, Disp: , Rfl:      liothyronine (CYTOMEL) 5 MCG tablet, Take 5 mcg by mouth daily, Disp: , Rfl:      loratadine (CLARITIN) 10 MG tablet, Take 10 mg by mouth daily, Disp: , Rfl:      LORazepam (ATIVAN) 1 MG tablet, Take 1 mg by mouth Take 1 tablet during the day and Take 3 tabs at 8pm, Disp: , Rfl:      metoprolol succinate ER (TOPROL-XL) 25 MG 24 hr tablet, Take 12.5 mg by mouth 2 times daily , Disp: , Rfl: 3     nitroGLYcerin (NITROSTAT) 0.4 MG sublingual tablet, Place 0.4 mg under the tongue every 5 minutes as needed , Disp: , Rfl:      ondansetron (ZOFRAN) 4 MG tablet, Take 1 tablet (4 mg) by mouth every 8 hours as needed for nausea, Disp: 30 tablet, Rfl: 1     ondansetron (ZOFRAN-ODT) 4 MG ODT tab, Take 1 tablet (4 mg) by mouth every 6 hours as needed for nausea or vomiting, Disp: 120 tablet, Rfl: 0     ondansetron (ZOFRAN-ODT) 4 MG ODT tab, Take 1 tablet by mouth 2 times daily as needed (Patient not taking: Reported on 10/13/2021), Disp: , Rfl:      pantoprazole (PROTONIX) 40 MG EC tablet, Take 1 tablet (40 mg) by mouth 2 times daily (before meals), Disp: 180 tablet, Rfl: 0     sertraline (ZOLOFT) 100 MG tablet, Take 100 mg by mouth daily  (Patient not taking: Reported on 10/13/2021), Disp: , Rfl:      sertraline (ZOLOFT) 50 MG tablet, TAKE ONE TAB BY MOUTH ONCE DAILY. TAKE ALONG WITH 200MG FOR A TOTAL DAILY DOSE OF 250MG (Patient not taking: Reported on 10/13/2021), Disp: , Rfl: 3     tiZANidine (ZANAFLEX) 4 MG tablet, Take 4 mg by mouth 2 times daily , Disp: , Rfl:      topiramate (TOPAMAX) 100 MG tablet, Take 100 mg by mouth 2 times daily , Disp: , Rfl: 2     traMADol (ULTRAM) 50 MG tablet, Take 50 mg by mouth 2 times daily , Disp: , Rfl: 2     vitamin D3 (CHOLECALCIFEROL) 2000 units tablet, Take 1 tablet by mouth daily  (Patient not taking:  Reported on 9/27/2021), Disp: , Rfl:       Physical Exam   alert and no distress  PSYCH: Alert and oriented times 3; coherent speech, normal   rate and volume, able to articulate logical thoughts, able   to abstract reason, no tangential thoughts, no hallucinations   or delusions, her affect is normal  RESP: No cough, no audible wheezing, able to talk in full sentences  Remainder of exam unable to be completed due to telephone visits      RADIOLOGY:     EXAMINATION: Liver Ultrasound Elastography, 12/1/2021 6:25 AM     COMPARISON: 10/21/2021 CT, 5/17/2021 elastography     HISTORY: Chronic liver disease; Fatty liver     FINDINGS:      Ultrasound elastography of the liver was performed using a Ndiaye  ultrasound machine using 10 separate measurements of the liver  parenchyma with patient in supine position. Measurements were obtained  approximately 2 cm beneath Jayce's capsule, perpendicular to the  liver capsule. Images are of satisfactory quality.     The median liver stiffness is: 1.19 m/sec  The mean liver stiffness is: 1.15 m/sec  The interquartile range is: 0.19  IQR/median: 0.16. (IQR/median value of greater than 0.15 indicates  that a dataset is unreliable)                                                                      IMPRESSION:  The median liver stiffness is 1.19 m/sec and the IQR/median value is  0.16, indicating an unreliable data set. This is a normal elastography  value with low likelihood of severe fibrosis or cirrhosis.        Consensus criteria for interpreting liver stiffness values in patients  with viral hepatitis or NAFLD according to SRU consensus guidelines  (Lopez et. al. Radiology: 2020. epub)     High probability of being normal:  <1.3 m/sec  Rules out advanced chronic liver disease in asymptomatic patients:  <1.7 m/sec  Suggestive of advanced chronic liver disease: 1.7-2.1 m/sec  Indicates advanced chronic liver disease: >2.1 m/sec  Suggestive of clinically significant portal  hypertension: >2.4 m/sec     DOYLE COE, DO          EXAMINATION: CT ABDOMEN PELVIS W CONTRAST, 10/21/2021 3:52 PM     TECHNIQUE:  Helical CT images from the lung bases through the  symphysis pubis were obtained with IV contrast. Contrast dose: Isovue  370 104mL     COMPARISON: Ultrasound 3/12/2019     HISTORY: Abdominal distension; Abdominal pain, generalized     FINDINGS:     Abdomen and pelvis: Focal fatty infiltration along the falciform  ligament. No suspicious focal hepatic lesion. Gallbladder, bile ducts,  pancreas, adrenal glands, and left kidney are unremarkable. Low-lying  right kidney with single ureter implanting normally in the urinary  bladder. Too  small to characterize hypodensity in the spleen may  represent hemangioma or cyst (series 3, image 72). Hysterectomy. No  dilated bowel. Appendectomy. No free fluid or free air. 1.2 cm portal  caval lymph node. Retroaortic left renal vein. Major abdominal vessels  are normal in caliber.     Lower thorax:     Lung bases are clear. No pleural effusion. Partially visualized pacer  lead in the right ventricle.     Bones and soft tissues: Well marginated 11 mm lucent lesion in the  right ischium(series 3, image 361), with no aggressive features  favored to be associated with degenerative changes in the adjacent  right hip joint.                                                                      IMPRESSION:      1. No acute finding to explain the patient's pain. No ascites or   high-grade bowel obstruction.   2. Congenital low-lying right kidney.     I have personally reviewed the examination and initial interpretation  and I agree with the findings.     NILESH DEL TORO MD         ASSESSMENT/PLAN:    1. Gastroesophageal reflux disease with esophagitis without hemorrhage  2. Nausea and vomiting, intractability of vomiting not specified, unspecified vomiting type  3. Fatty liver        Kaylah Arreola is a 56 year old female who presents for follow up.        She has ongoing upper abdominal pain with nausea and vomiting. Recent work up includes ct abd/pelvis and EGD in October 2021. Ct was unrevealing. egd noted la grade A esophagitis and small hiatal hernia but was otherwise unrevealing. She was restarted on protonix. She continued to have symptoms however and also notes early satiety and continued n/v. Previously recommended HIDA scan and 4 hr GES however she was not able to complete due to concerns with nuclear medicine testing. We have reviewed her work up thus far and possible differential which includes ongoing gerd vs. Gastroparesis vs cyclic vomiting syndrome. We recommended increase protonix to double dosing at last visit, working with nutritionist and [facility] [place].     Since our last visit she developed COVID and had worsening symptoms. She is still recovering from her symptoms but is starting to improve. She has been off of some of her medications including protonix but plans to restart. Advised that she can increase this to BID dosing as previously recommended.        In regards to fatty liver and chronic liver disease: her prior work up includes abd wojciech which noted fatty liver. Subsequent elastography in May 2021 suggested advanced liver disease. We referred her to a hepatologist to review further. She was suspected to have NAFLD but low suspicion for cirrhosis. She however did not agree with these findings and believes that her liver disease is more progressed than is being suggested and believes that she could have cirrhosis. We updated her elastography per her request which actually returned as normal. She also planned to meet with an outside hepatologist for their review. This was initially scheduled for this month however has been rescheduled to March ( about 2 months from now).         Return in about 6 months (around 7/24/2022) for Follow up, using a video visit.      Thank you for this consultation.  It was a pleasure to participate in the care of this  patient; please contact us with any further questions.        This note was created with voice recognition software, and while reviewed for accuracy, typos may remain.       40 minutes spent on the date of the encounter doing chart review, history and exam, documentation and further activities per the note    Gena Celestin PA-C  Gastroenterology  [facility]     Phone call duration: 16 minutes

## 2022-01-24 NOTE — PATIENT INSTRUCTIONS
It was a pleasure visiting with you today.     I hope you continue to improve as you are recovering from your recent COVID infection.     Please restart your protonix (pantoprazole) once daily. As previously recommended you can increase this to twice daily if upper GI symptoms persist.     Continue zofran as needed.     Report any new or worsening symptoms.       Gena Celestin PA-C  Gastroenterology  [facility]

## 2022-01-24 NOTE — NURSING NOTE
Patient verified meds and allergies are correct via patients echeck-in.    Nery Simley, Virtual Facilitator

## 2022-02-19 ENCOUNTER — HEALTH MAINTENANCE LETTER (OUTPATIENT)
Age: 57
End: 2022-02-19

## 2022-02-20 DIAGNOSIS — K21.00 GASTROESOPHAGEAL REFLUX DISEASE WITH ESOPHAGITIS WITHOUT HEMORRHAGE: ICD-10-CM

## 2022-02-22 RX ORDER — PANTOPRAZOLE SODIUM 40 MG/1
40 TABLET, DELAYED RELEASE ORAL
Qty: 180 TABLET | Refills: 0 | Status: SHIPPED | OUTPATIENT
Start: 2022-02-22 | End: 2022-05-20

## 2022-02-22 NOTE — TELEPHONE ENCOUNTER
Prescription approved per [facility] Refill Protocol.  Nohemi Gill RN, BSN   [facility]

## 2022-03-03 ENCOUNTER — TELEPHONE (OUTPATIENT)
Dept: GASTROENTEROLOGY | Facility: CLINIC | Age: 57
End: 2022-03-03
Payer: MEDICARE

## 2022-03-03 NOTE — TELEPHONE ENCOUNTER
[facility] Call Center    Phone Message    May a detailed message be left on voicemail: yes     Reason for Call: Form or Letter   Type or form/letter needing completion: Letter stating that Gena Celestin is providing care for her GI needs, prescribed pantoprazole (PROTONIX) 40 MG EC tablet and what the directions are for the medications    Provider: Gena Celestin    Date form needed: ASAP    Once completed: Fax form to: [phone]   (Any questions, call Asia at [facility]: [phone].)      Action Taken: Message routed to:  [facility] ([facility]    Travel Screening: Not Applicable

## 2022-03-10 NOTE — TELEPHONE ENCOUNTER
Attempted to reach Asia, Nursing Director at [facility], message left requesting return call.     Brenna Cherry RN

## 2022-03-11 NOTE — TELEPHONE ENCOUNTER
Asia at [facility] returned call.  Patient has signed up with [facility], which is a Nurse Practitioner group that rounds on the residents.  Asia states that the Nurse Practitioners will prescribe medications that the patient is currently taking and denies the need for any letter or documentation from Gena Celestin PA-C.      Brenna Cherry RN

## 2022-04-05 ENCOUNTER — VIRTUAL VISIT (OUTPATIENT)
Dept: GASTROENTEROLOGY | Facility: CLINIC | Age: 57
End: 2022-04-05
Payer: COMMERCIAL

## 2022-04-05 DIAGNOSIS — F33.1 MODERATE EPISODE OF RECURRENT MAJOR DEPRESSIVE DISORDER (H): ICD-10-CM

## 2022-04-05 DIAGNOSIS — F45.42 PAIN DISORDER ASSOCIATED WITH PSYCHOLOGICAL AND PHYSICAL FACTORS: Primary | ICD-10-CM

## 2022-04-05 DIAGNOSIS — F41.9 ANXIETY: ICD-10-CM

## 2022-04-05 PROCEDURE — 90791 PSYCH DIAGNOSTIC EVALUATION: CPT | Mod: GT | Performed by: PSYCHOLOGIST
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
## 2022-04-05 NOTE — LETTER
Date:April 22, 2022      Provider requested that no letter be sent. Do not send.       [facility]
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
## 2022-04-25 ENCOUNTER — TELEPHONE (OUTPATIENT)
Dept: GASTROENTEROLOGY | Facility: CLINIC | Age: 57
End: 2022-04-25
Payer: MEDICARE

## 2022-04-25 NOTE — TELEPHONE ENCOUNTER
4/25 [facility] to schedule patient for follow up around 7/25/2022. Provided patient with callback [phone].     Ramya Jj  Procedure    Cardiology, Rheumatology, GI, Pulmonology, Nephrology Specialties   [facility]  [phone]

## 2022-05-13 ENCOUNTER — TELEPHONE (OUTPATIENT)
Dept: GASTROENTEROLOGY | Facility: CLINIC | Age: 57
End: 2022-05-13
Payer: MEDICARE

## 2022-05-13 ENCOUNTER — TELEPHONE (OUTPATIENT)
Dept: PSYCHOLOGY | Facility: CLINIC | Age: 57
End: 2022-05-13
Payer: MEDICARE

## 2022-05-17 DIAGNOSIS — K21.00 GASTROESOPHAGEAL REFLUX DISEASE WITH ESOPHAGITIS WITHOUT HEMORRHAGE: ICD-10-CM

## 2022-05-20 RX ORDER — PANTOPRAZOLE SODIUM 40 MG/1
40 TABLET, DELAYED RELEASE ORAL 2 TIMES DAILY
Qty: 180 TABLET | Refills: 0 | Status: SHIPPED | OUTPATIENT
Start: 2022-05-20 | End: 2022-08-22

## 2022-05-20 NOTE — TELEPHONE ENCOUNTER
[employer] message sent inquiring about current dosing.  Patient read, but did not reply.  Refill authorized per [facility] protocol, message sent advising appointment needed for future refills.    Brenna Cherry RN

## 2022-05-23 DIAGNOSIS — K20.0 EOSINOPHILIC ESOPHAGITIS: ICD-10-CM

## 2022-05-23 NOTE — TELEPHONE ENCOUNTER
"COMPOUNDED NON-CONTROLLED SUBSTANCE (CMPD RX) - PHARMACY TO MIX COMPOUNDED MEDICATION      Last Written Prescription Date:  4/8/2021  Last Fill Quantity: 60 mg,   # refills: 1  Last Office Visit : 1/24/2022   Future Office visit:  none    Routing refill request to provider for review/approval because:  Refill needs review:  - compounded substance  - gap in time last ordered 4/8/2021 for 2 month supply  - per med list patient reported \"not taking\" on 10/31/2021  "

## 2022-05-27 ENCOUNTER — VIRTUAL VISIT (OUTPATIENT)
Dept: GASTROENTEROLOGY | Facility: CLINIC | Age: 57
End: 2022-05-27
Payer: MEDICARE

## 2022-05-27 DIAGNOSIS — F45.42 PAIN DISORDER ASSOCIATED WITH PSYCHOLOGICAL AND PHYSICAL FACTORS: Primary | ICD-10-CM

## 2022-05-27 PROCEDURE — 90834 PSYTX W PT 45 MINUTES: CPT | Mod: 95 | Performed by: PSYCHOLOGIST

## 2022-05-27 NOTE — LETTER
"    5/27/2022         RE: Kaylah Arreola  [address] [facility] [address]        Dear Colleague,    Thank you for referring your patient, Kaylah Arreola, to the [facility] [place]. Please see a copy of my visit note below.        Health Psychology Follow-Up Note    SUBJECTIVE:  Kaylah Arreola was seen for individual psychotherapy. Provided information about policies and practices of health psychology service, including appointment length, scheduling and cancelling appointments, clinical approaches, role of psychologist and patient in provision of psychological services, records and confidentiality, support in the event of behavioral emergencies, billing, and resources for expressing concerns over services provided. The patient was provided an information sheet detailing this information. The patient did not express questions about services; verbal consent to treatment was obtained.      Reviewed emotional and physical health since our last session. Pain was described as intermittent - times it doesn't bother her at all with being able to eat normally without pain (but has nausea) and periods of time that she has significant distress after eating (nausea, cramping, pain) that lasts for a few days in a row and only calm down when she transitions to a liquid diet (e.g, Ensure shakes). She reports that when she doesn't feel like eating food she can eat and tolerate chocolate sweets. Is questioning why she can tolerate chocolate when other foods are not tolerated. Will defer medical opinion on this to her upcoming GI appointment -   Next week she is planning on seeing a GI specialist (Marlys Perez PA-C at [facility]) - she agreed with this plan. However, we discussed from a psychological standpoint chocolate may be a \"safe\" food and not elicit a fear response; she agreed that she views chocolate as a \"comfort.\" She also notices that when stress is worse in her " "life digestion is worse - briefly reviewed numerous deaths of loved ones in the last year and how the anniversary of their deaths is coming up and symptoms are worsening.  Today she reported noticing starting to feel stomach pain when thinking about the anniversary of her mother's death.  In the last few weeks she has started medical cannabis which has helped reduce pain and improve sleep (sleeps 7-8 hours instead of 4 hours).    Created a collaborative treatment plan (see below).    Briefly reviewed psychoeducation about rationale for behavioral treatment with disorders of brain gut interaction.  She reported recent benefit in slowing down her thinking and observing her emotional reaction after a recent viral illness negatively impacted her mood.  To improve her ability to catch automatic and unhelpful thinking patterns, use relaxation and change perspective, reviewed the STOPP skill. Plan is for her to practice this as homework. Will introduce mindfulness exercises for symptom improvement next session.    OBJECTIVE:  Appearance/Behavior/Orientation: Alert and oriented to person, place, time, and situation.  Cooperation/Reliability: Patient was open and cooperative throughout the session.    Speech/Language: Speech was clear, coherent, and of normal rate, rhythm and volume.   Thought Form: Overall logical and organized.   Mood/Affect: Mood \"tired\"; affect mildly dysphoric  Insight/Motivation: Good, good  Suicide/Assault: Patient reports no current suicidal or assaultive ideation, plan, or intent.    ASSESSMENT:  Kaylah Arreola is a 56 year old female with complex psychiatric history and ongoing chronic pain and chronic digestive illness.  Extensive diagnostic testing has not identified structural causes for this pain.  She presents with several predisposing factors for disorder of brain gut interaction that could be contributing to digestive symptoms and abdominal pain including history of trauma/adversity " throughout her life that heightened the nervous system's detection of threat.  She currently presents with fear of pain and eating which may be maintaining factor.  She also presents with a complex psychiatric history and numerous stressors that have corresponded with the worsening of symptoms.  As a result she is likely a candidate that would benefit from behavioral interventions focused on a disorder of brain gut interaction to improve digestive complaints.        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     She is seeking a second opinion medically at [facility] on 6/1/22.    Start: 3:15 PM (delayed start due to confusion about video of telemedicine)  Stop: 4:02 PM      Teresa Cox, PhD,   Clinical Health Psychologist    Tx plan completed: 05/27/22  Tx plan due:  05/27/23    *no letter    This note was completed using Dragon voice recognition software.  Although reviewed after completion, some word and grammatical errors may occur.      OUTPATIENT TREATMENT PLAN SUMMARY    Date of Treatment Plan: 5/27/22     90-Day Review Date: NA  Date of Initial Service: 4/5/22       1. 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 Current symptoms and circumstances that substantiate the diagnosis:  Ongoing digestive distress with diarrhea, nausea, pain    3. How symptoms and/or behaviors are affecting level of function:  Restricting eating, limiting activity    4. Risk Assessment:  Suicide:  Assessed Level of Immediate Risk: Low  Ideation: No  Plan:  No  Means: No  Intent:  No    Homicide/Violence:  Assessed Level of Immediate Risk: Low  Ideation: No  Plan: No  Means: No  Intent: No    If on a medication, please include name and dosage: BuSpar, Lexapro and Ativan      Symptom/Problem Measurable Goals Interventions Gains Made    1.Chronic digestive distres 1. Use 2-3 effective coping strategies to improve digestive functioning 1.CBT 1. TBD   2.Chronic digestive distress 2. Use 2-3 effective copings strategies to reduce fear of digestive symptoms 2.CBT 2. TBD       5. Frequency of Sessions: 2 x / Month - Monthly    6. Discharge and Aftercare Goals: TBD    7. Expected duration of treatment:  4-10 sessions    8. Participants in therapy plan (family, friends, support network): Self      See scanned document for Acknowledgement of Current Treatment Plan - Patient signature not available due to telehealth format of session; verbal consent obtained.    Regulatory Guidelines for Updating Treatment Plan  [place] Medical Assistance: Reviewed & signed at least every 90 days  Medicare:  Update per policy        Sincerely,    Teresa Cox, PhD

## 2022-05-27 NOTE — PROGRESS NOTES
This telehealth service is appropriate and effective for delivering services in light of the necessity for social distancing to mitigate the COVID-19 epidemic and for conservation of PPE.     Patient has agreed to receiving telehealth services after being informed about it: Yes    Patient prefers video invitation/information to be sent by:   email    Mode of transmission: Telephone    Location of originating:  Home of the patient    Distance site:  Home office of provider for [facility]    The patient has been notified that:  Video visits will be conducted via a call with their psychologist to provide the care they need with a video conversation. Video visits may be billed at different rates depending on insurance coverage.  Patients are advised to please contact their insurance provider with any questions about their health insurance coverage. If during the course of a call the psychologist feels a video visit is not appropriate, patients will not be charged for this service.        Health Psychology Follow-Up Note    SUBJECTIVE:  Kaylah Arreola was seen for individual psychotherapy. Provided information about policies and practices of health psychology service, including appointment length, scheduling and cancelling appointments, clinical approaches, role of psychologist and patient in provision of psychological services, records and confidentiality, support in the event of behavioral emergencies, billing, and resources for expressing concerns over services provided. The patient was provided an information sheet detailing this information. The patient did not express questions about services; verbal consent to treatment was obtained.      Reviewed emotional and physical health since our last session. Pain was described as intermittent - times it doesn't bother her at all with being able to eat normally without pain (but has nausea) and periods of time that she has significant distress after eating (nausea, cramping,  "pain) that lasts for a few days in a row and only calm down when she transitions to a liquid diet (e.g, Ensure shakes). She reports that when she doesn't feel like eating food she can eat and tolerate chocolate sweets. Is questioning why she can tolerate chocolate when other foods are not tolerated. Will defer medical opinion on this to her upcoming GI appointment -   Next week she is planning on seeing a GI specialist (Marlys Perez PA-C at [facility]) - she agreed with this plan. However, we discussed from a psychological standpoint chocolate may be a \"safe\" food and not elicit a fear response; she agreed that she views chocolate as a \"comfort.\" She also notices that when stress is worse in her life digestion is worse - briefly reviewed numerous deaths of loved ones in the last year and how the anniversary of their deaths is coming up and symptoms are worsening.  Today she reported noticing starting to feel stomach pain when thinking about the anniversary of her mother's death.  In the last few weeks she has started medical cannabis which has helped reduce pain and improve sleep (sleeps 7-8 hours instead of 4 hours).    Created a collaborative treatment plan (see below).    Briefly reviewed psychoeducation about rationale for behavioral treatment with disorders of brain gut interaction.  She reported recent benefit in slowing down her thinking and observing her emotional reaction after a recent viral illness negatively impacted her mood.  To improve her ability to catch automatic and unhelpful thinking patterns, use relaxation and change perspective, reviewed the STOPP skill. Plan is for her to practice this as homework. Will introduce mindfulness exercises for symptom improvement next session.    OBJECTIVE:  Appearance/Behavior/Orientation: Alert and oriented to person, place, time, and situation.  Cooperation/Reliability: Patient was open and cooperative throughout the session.    Speech/Language: " "Speech was clear, coherent, and of normal rate, rhythm and volume.   Thought Form: Overall logical and organized.   Mood/Affect: Mood \"tired\"; affect mildly dysphoric  Insight/Motivation: Good, good  Suicide/Assault: Patient reports no current suicidal or assaultive ideation, plan, or intent.    ASSESSMENT:  Kaylah Arreola is a 56 year old female with complex psychiatric history and ongoing chronic pain and chronic digestive illness.  Extensive diagnostic testing has not identified structural causes for this pain.  She presents with several predisposing factors for disorder of brain gut interaction that could be contributing to digestive symptoms and abdominal pain including history of trauma/adversity throughout her life that heightened the nervous system's detection of threat.  She currently presents with fear of pain and eating which may be maintaining factor.  She also presents with a complex psychiatric history and numerous stressors that have corresponded with the worsening of symptoms.  As a result she is likely a candidate that would benefit from behavioral interventions focused on a disorder of brain gut interaction to improve digestive complaints.        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     She is seeking a second opinion medically at [facility] on 6/1/22.    Start: 3:15 PM (delayed start due to confusion about video of telemedicine)  Stop: 4:02 PM      Teresa Cox, PhD,   Clinical Health Psychologist    Tx plan completed: 05/27/22  Tx plan due:  05/27/23    *no letter    This note was completed using Dragon voice recognition software.  Although reviewed after completion, some word and grammatical errors may occur.      OUTPATIENT TREATMENT PLAN SUMMARY    Date of Treatment Plan: 5/27/22     90-Day Review Date: NA  Date of Initial Service: 4/5/22       1. 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 Current symptoms and circumstances that substantiate the diagnosis:  Ongoing digestive distress with diarrhea, nausea, pain    3. How symptoms and/or behaviors are affecting level of function:  Restricting eating, limiting activity    4. Risk Assessment:  Suicide:  Assessed Level of Immediate Risk: Low  Ideation: No  Plan:  No  Means: No  Intent:  No    Homicide/Violence:  Assessed Level of Immediate Risk: Low  Ideation: No  Plan: No  Means: No  Intent: No    If on a medication, please include name and dosage: BuSpar, Lexapro and Ativan      Symptom/Problem Measurable Goals Interventions Gains Made   1.Chronic digestive distres 1. Use 2-3 effective coping strategies to improve digestive functioning 1.CBT 1. TBD   2.Chronic digestive distress 2. Use 2-3 effective copings strategies to reduce fear of digestive symptoms 2.CBT 2. TBD       5. Frequency of Sessions: 2 x / Month - Monthly    6. Discharge and Aftercare Goals: TBD    7. Expected duration of treatment:  4-10 sessions    8. Participants in therapy plan (family, friends, support network): Self      See scanned document for Acknowledgement of Current Treatment Plan - Patient signature not available due to telehealth format of session; verbal consent obtained.    Regulatory Guidelines for Updating Treatment Plan  [place] Medical Assistance: Reviewed & signed at least every 90 days  Medicare:  Update per policy

## 2022-06-03 ENCOUNTER — VIRTUAL VISIT (OUTPATIENT)
Dept: GASTROENTEROLOGY | Facility: CLINIC | Age: 57
End: 2022-06-03
Payer: MEDICARE

## 2022-06-03 DIAGNOSIS — F45.42 PAIN DISORDER ASSOCIATED WITH PSYCHOLOGICAL AND PHYSICAL FACTORS: Primary | ICD-10-CM

## 2022-06-03 PROCEDURE — 99207 PR NO CHARGE LOS: CPT | Performed by: PSYCHOLOGIST

## 2022-06-03 NOTE — LETTER
6/3/2022         RE: Kaylah Arreola  [address] [facility]  [place] [address]        Dear Colleague,    Thank you for referring your patient, Kaylah Arreloa, to the [facility] [place]. Please see a copy of my visit note below.    Provider cancelled due to medical need. Will reschedule for next available time.   Teresa Cox, PhD,   Clinical Health Psychologist        Again, thank you for allowing me to participate in the care of your patient.        Sincerely,        Teresa Cox, PhD

## 2022-06-03 NOTE — LETTER
Date:June 14, 2022      Provider requested that no letter be sent. Do not send.       [facility]

## 2022-06-06 ENCOUNTER — TELEPHONE (OUTPATIENT)
Dept: GASTROENTEROLOGY | Facility: CLINIC | Age: 57
End: 2022-06-06

## 2022-06-06 NOTE — TELEPHONE ENCOUNTER
Called to cancel 06/06 and 06/13 [facility] appts due to Covid leave. LVM with K and L pod #s. Will also send MyChart then cancel.

## 2022-06-08 DIAGNOSIS — K20.0 EOSINOPHILIC ESOPHAGITIS: ICD-10-CM

## 2022-06-10 DIAGNOSIS — K20.0 EOSINOPHILIC ESOPHAGITIS: ICD-10-CM

## 2022-06-10 NOTE — TELEPHONE ENCOUNTER
[facility] Call Center    Phone Message    May a detailed message be left on voicemail: yes     Reason for Call: Medication Refill Request    Has the patient contacted the pharmacy for the refill? Yes   Name of medication being requested: COMPOUNDED NON-CONTROLLED SUBSTANCE (CMPD RX) - PHARMACY TO MIX COMPOUNDED MEDICATION, : Viscous budesonide,  Provider who prescribed the medication: Gena Celestin PA-C  Pharmacy: [facility] - [place] [address] [address]   Date medication is needed: ASAP    1st request 5/18/2022 per pharmacy       Action Taken: Message routed to:  Adult Clinics: Gastroenterology (GI) p [id number]    Travel Screening: Not Applicable

## 2022-06-10 NOTE — TELEPHONE ENCOUNTER
Contacted pharmacy, there is no option to choose mg as a dispensing qty any longer.  Technician states that the dispensing qty for 60 mg is 240 mL.  Qty updated.    Viscous budesonide  Last Written Prescription Date:  4/8/2021  Last Fill Quantity: 60 mg,  # refills: 1   Last office visit: 1/24/2022 with prescribing provider   Future Office Visit: N/A    Routing refill request to provider for review/approval because:  Drug not on the G refill protocol     Brenna Cherry RN

## 2022-06-10 NOTE — TELEPHONE ENCOUNTER
Viscous budesonide,       COMPOUNDED NON-CONTROLLED SUBSTANCE (CMPD RX) - PHARMACY TO MIX COMPOUNDED MEDICATION  Last Written Prescription Date:  4-8-21  Last Fill Quantity: 60 mg,   # refills: 1  Last Office Visit : 1-24-22  Future Office visit:  none    Routing refill request to provider for review/approval because:  Not on protocol

## 2022-06-11 NOTE — PROGRESS NOTES
Provider cancelled due to medical need. Will reschedule for next available time.   Teresa Cox, PhD,   Clinical Health Psychologist

## 2022-08-06 ENCOUNTER — HEALTH MAINTENANCE LETTER (OUTPATIENT)
Age: 57
End: 2022-08-06

## 2022-10-22 ENCOUNTER — HEALTH MAINTENANCE LETTER (OUTPATIENT)
Age: 57
End: 2022-10-22

## 2022-11-23 DIAGNOSIS — K21.00 GASTROESOPHAGEAL REFLUX DISEASE WITH ESOPHAGITIS WITHOUT HEMORRHAGE: ICD-10-CM

## 2022-11-23 RX ORDER — PANTOPRAZOLE SODIUM 40 MG/1
40 TABLET, DELAYED RELEASE ORAL 2 TIMES DAILY
Qty: 120 TABLET | Refills: 0 | Status: SHIPPED | OUTPATIENT
Start: 2022-11-23

## 2022-11-23 NOTE — TELEPHONE ENCOUNTER
Pantoprazole (Protonix) 40mg EC tablet. Take 1 tablet (40mg) by mouth 2 times daily.    Last Written Prescription Date:  8/22/22  Last Fill Quantity: 180,  # refills: 0   Last office visit:  1/24/22  Future Office Visit:  No visits scheduled    Refill approved per [facility] protocol.    Sharon Carrasco RN

## 2023-01-17 DIAGNOSIS — K21.00 GASTROESOPHAGEAL REFLUX DISEASE WITH ESOPHAGITIS WITHOUT HEMORRHAGE: ICD-10-CM

## 2023-01-18 NOTE — TELEPHONE ENCOUNTER
Pantoprazole (Protonix) 40mg tablet  Take 1 tablet by mouth twice daily.     Last Written Prescription Date: 11/23/2022  Last Fill Quantity: 120,  # refills: 0   Last office visit: 1/24/2022  Future Office Visit:  Not scheduled        Routing refill request to provider for review/approval because:  Passes protocol.   Last visit 1/24/2022 with recs to follow up in 6 months - never scheduled.    Jennifer Nguyen RN

## 2023-01-20 DIAGNOSIS — K21.00 GASTROESOPHAGEAL REFLUX DISEASE WITH ESOPHAGITIS WITHOUT HEMORRHAGE: ICD-10-CM

## 2023-01-25 RX ORDER — PANTOPRAZOLE SODIUM 40 MG/1
TABLET, DELAYED RELEASE ORAL
Qty: 120 TABLET | Refills: 0 | OUTPATIENT
Start: 2023-01-25

## 2023-01-25 NOTE — TELEPHONE ENCOUNTER
[employer] message sent that protonix refill had been refused and that a follow up appointment with GI provider was needed. Scheduling number provided.     Jennifer Nguyen RN

## 2023-01-26 RX ORDER — PANTOPRAZOLE SODIUM 40 MG/1
TABLET, DELAYED RELEASE ORAL
Qty: 90 TABLET | Refills: 1 | OUTPATIENT
Start: 2023-01-26

## 2023-01-26 NOTE — TELEPHONE ENCOUNTER
Duplicate request (one refused yesterday, [email or web address] message sent).   Refusing this request.     Jennifer Nguyen RN

## 2023-04-01 ENCOUNTER — HEALTH MAINTENANCE LETTER (OUTPATIENT)
Age: 58
End: 2023-04-01

## 2024-06-02 ENCOUNTER — HEALTH MAINTENANCE LETTER (OUTPATIENT)
Age: 59
End: 2024-06-02

## 2025-04-12 ENCOUNTER — HEALTH MAINTENANCE LETTER (OUTPATIENT)
Age: 60
End: 2025-04-12

## 2025-06-14 ENCOUNTER — HEALTH MAINTENANCE LETTER (OUTPATIENT)
Age: 60
End: 2025-06-14

## (undated) RX ORDER — ONDANSETRON 2 MG/ML
INJECTION INTRAMUSCULAR; INTRAVENOUS
Status: DISPENSED
Start: 2021-10-28

## (undated) RX ORDER — FENTANYL CITRATE 50 UG/ML
INJECTION, SOLUTION INTRAMUSCULAR; INTRAVENOUS
Status: DISPENSED
Start: 2021-01-07

## (undated) RX ORDER — PROPOFOL 10 MG/ML
INJECTION, EMULSION INTRAVENOUS
Status: DISPENSED
Start: 2021-10-28

## (undated) RX ORDER — ONDANSETRON 2 MG/ML
INJECTION INTRAMUSCULAR; INTRAVENOUS
Status: DISPENSED
Start: 2021-01-07

## (undated) RX ORDER — DIPHENHYDRAMINE HYDROCHLORIDE 50 MG/ML
INJECTION INTRAMUSCULAR; INTRAVENOUS
Status: DISPENSED
Start: 2021-01-07

## (undated) RX ORDER — GLYCOPYRROLATE 0.2 MG/ML
INJECTION, SOLUTION INTRAMUSCULAR; INTRAVENOUS
Status: DISPENSED
Start: 2021-10-28

## (undated) RX ORDER — LIDOCAINE HYDROCHLORIDE 20 MG/ML
INJECTION, SOLUTION EPIDURAL; INFILTRATION; INTRACAUDAL; PERINEURAL
Status: DISPENSED
Start: 2021-10-28

## (undated) RX ORDER — SCOLOPAMINE TRANSDERMAL SYSTEM 1 MG/1
PATCH, EXTENDED RELEASE TRANSDERMAL
Status: DISPENSED
Start: 2021-10-28